# Patient Record
Sex: FEMALE | Race: OTHER | HISPANIC OR LATINO | URBAN - METROPOLITAN AREA
[De-identification: names, ages, dates, MRNs, and addresses within clinical notes are randomized per-mention and may not be internally consistent; named-entity substitution may affect disease eponyms.]

---

## 2021-03-07 ENCOUNTER — INPATIENT (INPATIENT)
Facility: HOSPITAL | Age: 51
LOS: 5 days | Discharge: HOME | DRG: 418 | End: 2021-03-13
Attending: HOSPITALIST | Admitting: HOSPITALIST
Payer: COMMERCIAL

## 2021-03-07 ENCOUNTER — TRANSCRIPTION ENCOUNTER (OUTPATIENT)
Age: 51
End: 2021-03-07

## 2021-03-07 VITALS
SYSTOLIC BLOOD PRESSURE: 134 MMHG | DIASTOLIC BLOOD PRESSURE: 88 MMHG | WEIGHT: 147.93 LBS | TEMPERATURE: 97 F | HEART RATE: 85 BPM | OXYGEN SATURATION: 99 % | RESPIRATION RATE: 16 BRPM

## 2021-03-07 DIAGNOSIS — R74.01 ELEVATION OF LEVELS OF LIVER TRANSAMINASE LEVELS: ICD-10-CM

## 2021-03-07 DIAGNOSIS — Z90.710 ACQUIRED ABSENCE OF BOTH CERVIX AND UTERUS: Chronic | ICD-10-CM

## 2021-03-07 DIAGNOSIS — K85.90 ACUTE PANCREATITIS WITHOUT NECROSIS OR INFECTION, UNSPECIFIED: ICD-10-CM

## 2021-03-07 DIAGNOSIS — Z29.9 ENCOUNTER FOR PROPHYLACTIC MEASURES, UNSPECIFIED: ICD-10-CM

## 2021-03-07 LAB
ALBUMIN SERPL ELPH-MCNC: 4.4 G/DL — SIGNIFICANT CHANGE UP (ref 3.5–5)
ALP SERPL-CCNC: 431 U/L — HIGH (ref 40–120)
ALT FLD-CCNC: 794 U/L DA — HIGH (ref 10–60)
ANION GAP SERPL CALC-SCNC: 4 MMOL/L — LOW (ref 5–17)
APPEARANCE UR: CLEAR — SIGNIFICANT CHANGE UP
AST SERPL-CCNC: 471 U/L — HIGH (ref 10–40)
BACTERIA # UR AUTO: ABNORMAL /HPF
BASOPHILS # BLD AUTO: 0.02 K/UL — SIGNIFICANT CHANGE UP (ref 0–0.2)
BASOPHILS NFR BLD AUTO: 0.2 % — SIGNIFICANT CHANGE UP (ref 0–2)
BILIRUB SERPL-MCNC: 1.9 MG/DL — HIGH (ref 0.2–1.2)
BILIRUB UR-MCNC: NEGATIVE — SIGNIFICANT CHANGE UP
BUN SERPL-MCNC: 15 MG/DL — SIGNIFICANT CHANGE UP (ref 7–18)
CALCIUM SERPL-MCNC: 9.6 MG/DL — SIGNIFICANT CHANGE UP (ref 8.4–10.5)
CHLORIDE SERPL-SCNC: 105 MMOL/L — SIGNIFICANT CHANGE UP (ref 96–108)
CO2 SERPL-SCNC: 30 MMOL/L — SIGNIFICANT CHANGE UP (ref 22–31)
COLOR SPEC: YELLOW — SIGNIFICANT CHANGE UP
COMMENT - URINE: SIGNIFICANT CHANGE UP
CREAT SERPL-MCNC: 0.7 MG/DL — SIGNIFICANT CHANGE UP (ref 0.5–1.3)
DIFF PNL FLD: NEGATIVE — SIGNIFICANT CHANGE UP
EOSINOPHIL # BLD AUTO: 0.07 K/UL — SIGNIFICANT CHANGE UP (ref 0–0.5)
EOSINOPHIL NFR BLD AUTO: 0.9 % — SIGNIFICANT CHANGE UP (ref 0–6)
EPI CELLS # UR: SIGNIFICANT CHANGE UP /HPF
GLUCOSE SERPL-MCNC: 109 MG/DL — HIGH (ref 70–99)
GLUCOSE UR QL: NEGATIVE — SIGNIFICANT CHANGE UP
HCG UR QL: NEGATIVE — SIGNIFICANT CHANGE UP
HCT VFR BLD CALC: 44.3 % — SIGNIFICANT CHANGE UP (ref 34.5–45)
HGB BLD-MCNC: 14.3 G/DL — SIGNIFICANT CHANGE UP (ref 11.5–15.5)
IMM GRANULOCYTES NFR BLD AUTO: 0.2 % — SIGNIFICANT CHANGE UP (ref 0–1.5)
KETONES UR-MCNC: NEGATIVE — SIGNIFICANT CHANGE UP
LEUKOCYTE ESTERASE UR-ACNC: ABNORMAL
LIDOCAIN IGE QN: 7299 U/L — HIGH (ref 73–393)
LYMPHOCYTES # BLD AUTO: 2.39 K/UL — SIGNIFICANT CHANGE UP (ref 1–3.3)
LYMPHOCYTES # BLD AUTO: 29.6 % — SIGNIFICANT CHANGE UP (ref 13–44)
MCHC RBC-ENTMCNC: 29.6 PG — SIGNIFICANT CHANGE UP (ref 27–34)
MCHC RBC-ENTMCNC: 32.3 GM/DL — SIGNIFICANT CHANGE UP (ref 32–36)
MCV RBC AUTO: 91.7 FL — SIGNIFICANT CHANGE UP (ref 80–100)
MONOCYTES # BLD AUTO: 0.57 K/UL — SIGNIFICANT CHANGE UP (ref 0–0.9)
MONOCYTES NFR BLD AUTO: 7.1 % — SIGNIFICANT CHANGE UP (ref 2–14)
NEUTROPHILS # BLD AUTO: 5.01 K/UL — SIGNIFICANT CHANGE UP (ref 1.8–7.4)
NEUTROPHILS NFR BLD AUTO: 62 % — SIGNIFICANT CHANGE UP (ref 43–77)
NITRITE UR-MCNC: NEGATIVE — SIGNIFICANT CHANGE UP
NRBC # BLD: 0 /100 WBCS — SIGNIFICANT CHANGE UP (ref 0–0)
PH UR: 7 — SIGNIFICANT CHANGE UP (ref 5–8)
PLATELET # BLD AUTO: 274 K/UL — SIGNIFICANT CHANGE UP (ref 150–400)
POTASSIUM SERPL-MCNC: 3.9 MMOL/L — SIGNIFICANT CHANGE UP (ref 3.5–5.3)
POTASSIUM SERPL-SCNC: 3.9 MMOL/L — SIGNIFICANT CHANGE UP (ref 3.5–5.3)
PROT SERPL-MCNC: 8.6 G/DL — HIGH (ref 6–8.3)
PROT UR-MCNC: NEGATIVE — SIGNIFICANT CHANGE UP
RBC # BLD: 4.83 M/UL — SIGNIFICANT CHANGE UP (ref 3.8–5.2)
RBC # FLD: 13.2 % — SIGNIFICANT CHANGE UP (ref 10.3–14.5)
RBC CASTS # UR COMP ASSIST: SIGNIFICANT CHANGE UP /HPF (ref 0–2)
SARS-COV-2 RNA SPEC QL NAA+PROBE: SIGNIFICANT CHANGE UP
SODIUM SERPL-SCNC: 139 MMOL/L — SIGNIFICANT CHANGE UP (ref 135–145)
SP GR SPEC: 1.01 — SIGNIFICANT CHANGE UP (ref 1.01–1.02)
UROBILINOGEN FLD QL: 4
WBC # BLD: 8.08 K/UL — SIGNIFICANT CHANGE UP (ref 3.8–10.5)
WBC # FLD AUTO: 8.08 K/UL — SIGNIFICANT CHANGE UP (ref 3.8–10.5)
WBC UR QL: ABNORMAL /HPF (ref 0–5)

## 2021-03-07 PROCEDURE — 99222 1ST HOSP IP/OBS MODERATE 55: CPT

## 2021-03-07 PROCEDURE — G1004: CPT

## 2021-03-07 PROCEDURE — 76705 ECHO EXAM OF ABDOMEN: CPT | Mod: 26

## 2021-03-07 PROCEDURE — 99285 EMERGENCY DEPT VISIT HI MDM: CPT

## 2021-03-07 PROCEDURE — 74177 CT ABD & PELVIS W/CONTRAST: CPT | Mod: 26,MG

## 2021-03-07 RX ORDER — KETOROLAC TROMETHAMINE 30 MG/ML
30 SYRINGE (ML) INJECTION EVERY 8 HOURS
Refills: 0 | Status: DISCONTINUED | OUTPATIENT
Start: 2021-03-07 | End: 2021-03-09

## 2021-03-07 RX ORDER — ONDANSETRON 8 MG/1
4 TABLET, FILM COATED ORAL ONCE
Refills: 0 | Status: COMPLETED | OUTPATIENT
Start: 2021-03-07 | End: 2021-03-07

## 2021-03-07 RX ORDER — SODIUM CHLORIDE 9 MG/ML
1000 INJECTION, SOLUTION INTRAVENOUS
Refills: 0 | Status: DISCONTINUED | OUTPATIENT
Start: 2021-03-07 | End: 2021-03-12

## 2021-03-07 RX ORDER — SODIUM CHLORIDE 9 MG/ML
1000 INJECTION INTRAMUSCULAR; INTRAVENOUS; SUBCUTANEOUS
Refills: 0 | Status: DISCONTINUED | OUTPATIENT
Start: 2021-03-07 | End: 2021-03-07

## 2021-03-07 RX ORDER — FAMOTIDINE 10 MG/ML
20 INJECTION INTRAVENOUS ONCE
Refills: 0 | Status: COMPLETED | OUTPATIENT
Start: 2021-03-07 | End: 2021-03-07

## 2021-03-07 RX ORDER — KETOROLAC TROMETHAMINE 30 MG/ML
15 SYRINGE (ML) INJECTION EVERY 4 HOURS
Refills: 0 | Status: DISCONTINUED | OUTPATIENT
Start: 2021-03-07 | End: 2021-03-07

## 2021-03-07 RX ORDER — PANTOPRAZOLE SODIUM 20 MG/1
40 TABLET, DELAYED RELEASE ORAL DAILY
Refills: 0 | Status: DISCONTINUED | OUTPATIENT
Start: 2021-03-07 | End: 2021-03-11

## 2021-03-07 RX ORDER — ENOXAPARIN SODIUM 100 MG/ML
40 INJECTION SUBCUTANEOUS DAILY
Refills: 0 | Status: DISCONTINUED | OUTPATIENT
Start: 2021-03-07 | End: 2021-03-10

## 2021-03-07 RX ORDER — IBUPROFEN 200 MG
600 TABLET ORAL EVERY 6 HOURS
Refills: 0 | Status: DISCONTINUED | OUTPATIENT
Start: 2021-03-07 | End: 2021-03-12

## 2021-03-07 RX ORDER — MORPHINE SULFATE 50 MG/1
4 CAPSULE, EXTENDED RELEASE ORAL ONCE
Refills: 0 | Status: DISCONTINUED | OUTPATIENT
Start: 2021-03-07 | End: 2021-03-07

## 2021-03-07 RX ORDER — ONDANSETRON 8 MG/1
4 TABLET, FILM COATED ORAL EVERY 6 HOURS
Refills: 0 | Status: DISCONTINUED | OUTPATIENT
Start: 2021-03-07 | End: 2021-03-11

## 2021-03-07 RX ADMIN — MORPHINE SULFATE 4 MILLIGRAM(S): 50 CAPSULE, EXTENDED RELEASE ORAL at 18:37

## 2021-03-07 RX ADMIN — FAMOTIDINE 20 MILLIGRAM(S): 10 INJECTION INTRAVENOUS at 18:36

## 2021-03-07 RX ADMIN — SODIUM CHLORIDE 200 MILLILITER(S): 9 INJECTION, SOLUTION INTRAVENOUS at 23:58

## 2021-03-07 RX ADMIN — ONDANSETRON 4 MILLIGRAM(S): 8 TABLET, FILM COATED ORAL at 18:36

## 2021-03-07 RX ADMIN — MORPHINE SULFATE 4 MILLIGRAM(S): 50 CAPSULE, EXTENDED RELEASE ORAL at 19:07

## 2021-03-07 NOTE — ED PROVIDER NOTE - CONSTITUTIONAL, MLM
normal... moderate distress, awake, alert, oriented to person, place, time/situation and in no apparent distress.

## 2021-03-07 NOTE — H&P ADULT - ASSESSMENT
52 yo female with no PMH presented with intermitted epigastric abdominal pain for 1 week. Patient is admitted for abdominal Pain possibly due to Gallstone Pancreatitis

## 2021-03-07 NOTE — H&P ADULT - NSHPPHYSICALEXAM_GEN_ALL_CORE
Vital Signs Last 24 Hrs  T(C): 36.2 (07 Mar 2021 17:25), Max: 36.2 (07 Mar 2021 17:25)  T(F): 97.1 (07 Mar 2021 17:25), Max: 97.1 (07 Mar 2021 17:25)  HR: 85 (07 Mar 2021 17:25) (85 - 85)  BP: 134/88 (07 Mar 2021 17:25) (134/88 - 134/88)  BP(mean): --  RR: 16 (07 Mar 2021 17:25) (16 - 16)  SpO2: 99% (07 Mar 2021 17:25) (99% - 99%)    GENERAL: NAD, lying in bed comfortably  HEAD:  Atraumatic, Normocephalic  EYES: EOMI, PERRLA, conjunctiva and sclera clear  ENT: Moist mucous membranes  NECK: Supple, No JVD  CHEST/LUNG: Clear to auscultation bilaterally; No rales, rhonchi, wheezing, or rubs.  HEART: Regular rate and rhythm; S1+ S2+  ABDOMEN: Bowel sounds present; Soft, tender to palpate in epigastric area, no rebound tenderness  EXTREMITIES:  2+ Peripheral Pulses, brisk capillary refill. No clubbing, cyanosis, or edema  NERVOUS SYSTEM:  Alert & Oriented , speech clear. No deficits   MSK: FROM all 4 extremities, full and equal strength  SKIN: No rashes or lesions

## 2021-03-07 NOTE — H&P ADULT - HISTORY OF PRESENT ILLNESS
52 yo female with no PMH presented with intermitted epigastric abdominal pain for 1 week. Patient states pain is radiating to the back and is associated with nausea. Patient has no previous hx of similar pain. Patient denies taking any medications, drugs, or alcohol. Patient denies CP, sob, diarrhea, constipation, headache, flu like symptoms.

## 2021-03-07 NOTE — ED PROVIDER NOTE - OBJECTIVE STATEMENT
50 y/o female with PSHx of hysterectomy, coming into the ED with history of intermittent right upper quadrant pain radiating to back x 1 week. Patient states she feels fine one day and next day feel bloated, nauseated, and feel severe pain. Patient denies history of alcohol use. Patient denies diarrhea, vomiting, or any other complaints. NKDA

## 2021-03-07 NOTE — H&P ADULT - ATTENDING COMMENTS
Patient is a 51 year old female with no reported PMH who presented with a chief complaint of abdominal pain.  ( ID: [id number])  Patient states that beginning the several days prior to current presentation, she began to experience progressively worsening, epigastric abdominal pain currently rated 10/10 in intensity, associated with nausea.    At time of examination in the ED, patient denies any headache, dizziness, chest pain, palpitations, shortness of breath, vomiting/diarrhea.    T(C): 36.2 (03-07-21 @ 17:25), Max: 36.2 (03-07-21 @ 17:25)  T(F): 97.1 (03-07-21 @ 17:25), Max: 97.1 (03-07-21 @ 17:25)  HR: 85 (03-07-21 @ 17:25) (85 - 85)  BP: 134/88 (03-07-21 @ 17:25) (134/88 - 134/88)  RR: 16 (03-07-21 @ 17:25) (16 - 16)  SpO2: 99% (03-07-21 @ 17:25) (99% - 99%)  Wt(kg): --    P/E: As above MAR    A/P:    Abdominal Pain possibly due to Gallstone Pancreatitis:  -Ultrasound Abdomen identified cholelithiasis. No pericholecystic fluid or significant wall thickening is seen. Sonographic Martines's sign was reportedly negative. Partially visualized pancreas appears unremarkable without peripancreatic fluid.  -CT Abdomen/Pelvis with IV contrast identified Suggestion of gallstones as better delineated on recent right upper quadrant ultrasound.  Hyperemic thickened gallbladder wall with trace surrounding stranding.  Pancreas within normal limits  -Lipase= 7,299  -BISAP Score= 0, Patients with a BISAP Score of 0 had <1% risk of mortality, and one study stratified patients with a score =2, given a mortality risk of 1.9%.  -Will admit to the medical floor  -Alcohol, Blood and UDS still pending in ED  -Will send Amylase, Lipid Panel, GGT, Vitamin D, ESR, CRP, Procalcitonin with AM labs.  If these initial studies are entirely unremarkable, can also consider sending IgG4, RF, REGAN, antismooth muscle antibodies  -NPO (except meds), for now  -Will start Lactated Ringer at a rate of 200mL/h, for now  -Strict I&O's with a goal urine output of 1cc/kg/hour  -Close monitoring and correction of electrolyte derangements (including glucose) as necessary  -Vital Signs Q4H  -Will hold additional antimicrobials, for now, as there is no suspicion of infected pancreatic necrosis  -Will need to ask GI to evaluate patient for further recommendations, such as possible MRCP    Nausea:  -Zofran PRN for nausea  -Will continue to closely monitor QT interval for evidence of prolongation    Elevated Alk Phos and Transaminitis:  -Will send GGT and Hepatitis Panel  -Will need to ask GI to evaluate patient for further recommendations, such as possible MRCP    Hyperbilirubinemia:  -As above  -Will also send Bilirubin Total, Direct and Indirect    Elevated Total Protein:  -Gamma Gap= 4.6 (Total Protein= 8.6, Albumin= 4.4)  -Will send HIV testing    Uncontrolled Blood Glucose:  -Hemoglobin A1c with AM labs  -Blood Glucose Monitoring ACHS  -Low-Dose Insulin Sliding Scale ACHS  -NPO (except meds), for now    GI/DVT PPx:  -Lovenox  -PPI

## 2021-03-07 NOTE — H&P ADULT - PROBLEM SELECTOR PLAN 2
Pt noted to have elevated LFT  - fu GGT and Hepatitis Panel  - bili elevated; fu total and direct bilirubin  - fu GI

## 2021-03-07 NOTE — H&P ADULT - PROBLEM SELECTOR PLAN 1
Abdominal Pain possibly due to Gallstone Pancreatitis:  -Ultrasound Abdomen identified cholelithiasis. No pericholecystic fluid or significant wall thickening is seen. Sonographic Martines's sign was reportedly negative. Partially visualized pancreas appears unremarkable without peripancreatic fluid.  -CT Abdomen/Pelvis with IV contrast identified Suggestion of gallstones as better delineated on recent right upper quadrant ultrasound.  Hyperemic thickened gallbladder wall with trace surrounding stranding.  Pancreas within normal limits  -Lipase= 7,299  -BISAP Score= 0, Patients with a BISAP Score of 0 had <1% risk of mortality, and one study stratified patients with a score =2, given a mortality risk of 1.9%.  -fu Alcohol, UDS still pending in ED  -fu Amylase, Lipid Panel, GGT, Vitamin D, ESR, Procalcitonin   -send Auto-immune work up after initial work up is completed   -NPO for now  -Will start Lactated Ringer at a rate of 200cc  -Will ask GI to evaluate patient for possible MRCP/ HIDA scan   -GI Dr. Jo consulted

## 2021-03-07 NOTE — H&P ADULT - PROBLEM SELECTOR PLAN 3
IMPROVE VTE Individual Risk Assessment  RISK                                                         Points  [  ] Previous VTE                                      3  [  ] Thrombophilia                                   2  [  ] Lower limb paralysis                         2 (unable to hold up >15 seconds)    [  ] Current Cancer                                  2       (within 6 months)  [  ] Immobilization > 24 hrs                    1  [  ] ICU/CCU stay > 24 hrs                         1  [  ] Age > 60                                              1   cw lovenox for DVT ppx

## 2021-03-08 LAB
24R-OH-CALCIDIOL SERPL-MCNC: 44.5 NG/ML — SIGNIFICANT CHANGE UP (ref 30–80)
A1C WITH ESTIMATED AVERAGE GLUCOSE RESULT: 5.9 % — HIGH (ref 4–5.6)
ALBUMIN SERPL ELPH-MCNC: 3.5 G/DL — SIGNIFICANT CHANGE UP (ref 3.5–5)
ALP SERPL-CCNC: 331 U/L — HIGH (ref 40–120)
ALT FLD-CCNC: 641 U/L DA — HIGH (ref 10–60)
AMPHET UR-MCNC: NEGATIVE — SIGNIFICANT CHANGE UP
AMYLASE P1 CFR SERPL: 84 U/L — SIGNIFICANT CHANGE UP (ref 25–115)
APAP SERPL-MCNC: <10 UG/ML — SIGNIFICANT CHANGE UP (ref 10–30)
AST SERPL-CCNC: 288 U/L — HIGH (ref 10–40)
BARBITURATES UR SCN-MCNC: NEGATIVE — SIGNIFICANT CHANGE UP
BENZODIAZ UR-MCNC: NEGATIVE — SIGNIFICANT CHANGE UP
BILIRUB DIRECT SERPL-MCNC: 0.3 MG/DL — HIGH (ref 0–0.2)
BILIRUB DIRECT SERPL-MCNC: 0.3 MG/DL — HIGH (ref 0–0.2)
BILIRUB INDIRECT FLD-MCNC: 0.5 MG/DL — SIGNIFICANT CHANGE UP (ref 0.2–1)
BILIRUB INDIRECT FLD-MCNC: 0.5 MG/DL — SIGNIFICANT CHANGE UP (ref 0.2–1)
BILIRUB SERPL-MCNC: 0.8 MG/DL — SIGNIFICANT CHANGE UP (ref 0.2–1.2)
BILIRUB SERPL-MCNC: 0.8 MG/DL — SIGNIFICANT CHANGE UP (ref 0.2–1.2)
CHOLEST SERPL-MCNC: 218 MG/DL — HIGH
COCAINE METAB.OTHER UR-MCNC: NEGATIVE — SIGNIFICANT CHANGE UP
ERYTHROCYTE [SEDIMENTATION RATE] IN BLOOD: 7 MM/HR — SIGNIFICANT CHANGE UP (ref 0–20)
ESTIMATED AVERAGE GLUCOSE: 123 MG/DL — HIGH (ref 68–114)
ETHANOL SERPL-MCNC: <3 MG/DL — SIGNIFICANT CHANGE UP (ref 0–10)
FERRITIN SERPL-MCNC: 107 NG/ML — SIGNIFICANT CHANGE UP (ref 15–150)
FOLATE SERPL-MCNC: >20 NG/ML — SIGNIFICANT CHANGE UP
GGT SERPL-CCNC: 1093 U/L — HIGH (ref 8–40)
HAV IGM SER-ACNC: SIGNIFICANT CHANGE UP
HBV CORE IGM SER-ACNC: SIGNIFICANT CHANGE UP
HBV SURFACE AG SER-ACNC: SIGNIFICANT CHANGE UP
HCT VFR BLD CALC: 39.7 % — SIGNIFICANT CHANGE UP (ref 34.5–45)
HCV AB S/CO SERPL IA: 0.11 S/CO — SIGNIFICANT CHANGE UP (ref 0–0.99)
HCV AB SERPL-IMP: SIGNIFICANT CHANGE UP
HDLC SERPL-MCNC: 93 MG/DL — SIGNIFICANT CHANGE UP
HGB BLD-MCNC: 12.6 G/DL — SIGNIFICANT CHANGE UP (ref 11.5–15.5)
LDH SERPL L TO P-CCNC: 261 U/L — HIGH (ref 120–225)
LIDOCAIN IGE QN: 540 U/L — HIGH (ref 73–393)
LIPID PNL WITH DIRECT LDL SERPL: 114 MG/DL — HIGH
MAGNESIUM SERPL-MCNC: 2.2 MG/DL — SIGNIFICANT CHANGE UP (ref 1.6–2.6)
MCHC RBC-ENTMCNC: 28.8 PG — SIGNIFICANT CHANGE UP (ref 27–34)
MCHC RBC-ENTMCNC: 31.7 GM/DL — LOW (ref 32–36)
MCV RBC AUTO: 90.8 FL — SIGNIFICANT CHANGE UP (ref 80–100)
METHADONE UR-MCNC: NEGATIVE — SIGNIFICANT CHANGE UP
NON HDL CHOLESTEROL: 125 MG/DL — SIGNIFICANT CHANGE UP
NRBC # BLD: 0 /100 WBCS — SIGNIFICANT CHANGE UP (ref 0–0)
OPIATES UR-MCNC: POSITIVE
PCP SPEC-MCNC: SIGNIFICANT CHANGE UP
PCP UR-MCNC: NEGATIVE — SIGNIFICANT CHANGE UP
PHOSPHATE SERPL-MCNC: 4.3 MG/DL — SIGNIFICANT CHANGE UP (ref 2.5–4.5)
PLATELET # BLD AUTO: 248 K/UL — SIGNIFICANT CHANGE UP (ref 150–400)
PROCALCITONIN SERPL-MCNC: 0.11 NG/ML — HIGH (ref 0.02–0.1)
PROT SERPL-MCNC: 6.8 G/DL — SIGNIFICANT CHANGE UP (ref 6–8.3)
RBC # BLD: 4.37 M/UL — SIGNIFICANT CHANGE UP (ref 3.8–5.2)
RBC # FLD: 13.1 % — SIGNIFICANT CHANGE UP (ref 10.3–14.5)
SARS-COV-2 IGG SERPL QL IA: NEGATIVE — SIGNIFICANT CHANGE UP
SARS-COV-2 IGM SERPL IA-ACNC: 0.07 INDEX — SIGNIFICANT CHANGE UP
THC UR QL: NEGATIVE — SIGNIFICANT CHANGE UP
TRIGL SERPL-MCNC: 53 MG/DL — SIGNIFICANT CHANGE UP
TSH SERPL-MCNC: 2 UU/ML — SIGNIFICANT CHANGE UP (ref 0.34–4.82)
VIT B12 SERPL-MCNC: 1781 PG/ML — HIGH (ref 232–1245)
WBC # BLD: 5.22 K/UL — SIGNIFICANT CHANGE UP (ref 3.8–10.5)
WBC # FLD AUTO: 5.22 K/UL — SIGNIFICANT CHANGE UP (ref 3.8–10.5)

## 2021-03-08 PROCEDURE — 99232 SBSQ HOSP IP/OBS MODERATE 35: CPT | Mod: GC

## 2021-03-08 PROCEDURE — 74181 MRI ABDOMEN W/O CONTRAST: CPT | Mod: 26

## 2021-03-08 PROCEDURE — 99222 1ST HOSP IP/OBS MODERATE 55: CPT

## 2021-03-08 RX ORDER — INFLUENZA VIRUS VACCINE 15; 15; 15; 15 UG/.5ML; UG/.5ML; UG/.5ML; UG/.5ML
0.5 SUSPENSION INTRAMUSCULAR ONCE
Refills: 0 | Status: DISCONTINUED | OUTPATIENT
Start: 2021-03-08 | End: 2021-03-13

## 2021-03-08 RX ADMIN — Medication 30 MILLIGRAM(S): at 22:08

## 2021-03-08 RX ADMIN — Medication 30 MILLIGRAM(S): at 22:50

## 2021-03-08 RX ADMIN — ENOXAPARIN SODIUM 40 MILLIGRAM(S): 100 INJECTION SUBCUTANEOUS at 11:11

## 2021-03-08 RX ADMIN — PANTOPRAZOLE SODIUM 40 MILLIGRAM(S): 20 TABLET, DELAYED RELEASE ORAL at 11:11

## 2021-03-08 NOTE — PROGRESS NOTE ADULT - PROBLEM SELECTOR PLAN 1
- p/w epigastic pain   - callie 2/2 Gallstone Pancreatitis  - BAL <3  - lipase 7,299 >540  - U/S abd cholelithiasis  - CT A/P gallstones, but pancreas within normal limits  - BISAP Score= 0  - c/w LR 200cc  - f/u MRCP  - GI, Dr. Jo, onboard

## 2021-03-08 NOTE — PATIENT PROFILE ADULT - OVER THE PAST TWO WEEKS HAVE YOU FELT DOWN, DEPRESSED OR HOPELESS?
Problem: Non-Pressure Injury Wound  Goal: # No deterioration in wound  Outcome: Outcome Met, Continue evaluating goal progress toward completion  Wound continuing to show improvement.  Some maceration noted at edges and blistering at edges of tape.  Duoderm placed at wound margins to protect form foam.  Duoderm placed over blistered areas.  Explained to patient what it was and that it may come off on it's own and that was okay if that happened       
no

## 2021-03-08 NOTE — PROGRESS NOTE ADULT - SUBJECTIVE AND OBJECTIVE BOX
PGY-1 Progress Note discussed with attending    PAGER #: [[phone]] TILL 5:00 PM  PLEASE CONTACT ON CALL TEAM:  - On Call Team (Please refer to Jatin) FROM 5:00 PM - 8:30PM  - Nightfloat Team FROM 8:30 -7:30 AM    INTERVAL HPI  - CT abd showed gallstone without any peripancreatic changes. Lipase noted to be 7299    OVERNIGHT EVENTS:   - Patient's abdominal pain improved with pain management, NPO and IVF. She reports returning appetite. She denies fever, chills, n/v, chest pain, sob, abdominal pain, urinary or bowel movement changes.    REVIEW OF SYSTEMS:  CONSTITUTIONAL: No fever, weight loss, or fatigue  RESPIRATORY: No cough, wheezing, chills or hemoptysis; No shortness of breath  CARDIOVASCULAR: No chest pain, palpitations, dizziness, or leg swelling  GASTROINTESTINAL: No abdominal pain. No nausea, vomiting, or hematemesis; No diarrhea or constipation. No melena or hematochezia.  GENITOURINARY: No dysuria or hematuria, urinary frequency  NEUROLOGICAL: No headaches, memory loss, loss of strength, numbness, or tremors  SKIN: No itching, burning, rashes, or lesions     MEDICATIONS  (STANDING):  enoxaparin Injectable 40 milliGRAM(s) SubCutaneous daily  influenza   Vaccine 0.5 milliLiter(s) IntraMuscular once  ketorolac   Injectable 30 milliGRAM(s) IV Push every 8 hours  lactated ringers. 1000 milliLiter(s) (200 mL/Hr) IV Continuous <Continuous>  pantoprazole  Injectable 40 milliGRAM(s) IV Push daily    MEDICATIONS  (PRN):  ibuprofen  Tablet. 600 milliGRAM(s) Oral every 6 hours PRN Mild Pain (1 - 3)  ketorolac   Injectable 15 milliGRAM(s) IV Push every 4 hours PRN Moderate Pain (4 - 6)  ondansetron Injectable 4 milliGRAM(s) IV Push every 6 hours PRN Nausea and/or Vomiting      Vital Signs Last 24 Hrs  T(C): 36.4 (08 Mar 2021 14:44), Max: 36.5 (07 Mar 2021 23:30)  T(F): 97.6 (08 Mar 2021 14:44), Max: 97.7 (07 Mar 2021 23:30)  HR: 74 (08 Mar 2021 14:44) (62 - 85)  BP: 115/80 (08 Mar 2021 14:44) (102/64 - 134/88)  BP(mean): --  RR: 18 (08 Mar 2021 14:44) (16 - 18)  SpO2: 99% (08 Mar 2021 14:44) (96% - 99%)    PHYSICAL EXAMINATION:  GENERAL: NAD, AAOx3  HEAD: AT/NC  EYES: conjunctiva and sclera clear  NECK: supple, No JVD noted, Normal thyroid  CHEST/LUNG: CTABL; no rales, rhonchi, wheezing, or rubs  HEART: regular rate and rhythm; no murmurs, rubs, or gallops  ABDOMEN: soft, nontender, nondistended; Bowel sounds present  EXTREMITIES:  2+ Peripheral Pulses, No clubbing, cyanosis, or edema  SKIN: warm dry                          12.6   5.22  )-----------( 248      ( 08 Mar 2021 06:45 )             39.7     03-07    139  |  105  |  15  ----------------------------<  109<H>  3.9   |  30  |  0.70    Ca    9.6      07 Mar 2021 18:36  Phos  4.3     03-08  Mg     2.2     03-08    TPro  6.8  /  Alb  3.5  /  TBili  0.8  /  DBili  0.3<H>  /  AST  288<H>  /  ALT  641<H>  /  AlkPhos  331<H>  03-08    LIVER FUNCTIONS - ( 08 Mar 2021 06:45 )  Alb: 3.5 g/dL / Pro: 6.8 g/dL / ALK PHOS: 331 U/L / ALT: 641 U/L DA / AST: 288 U/L / GGT: x                 COVID-19 PCR: NotDetec (07 Mar 2021 21:18)      CAPILLARY BLOOD GLUCOSE          RADIOLOGY & ADDITIONAL TESTS:

## 2021-03-08 NOTE — PROGRESS NOTE ADULT - ASSESSMENT
50 yo female with no PMH presented with intermitted epigastric abdominal pain for 1 week. Patient is admitted for abdominal Pain possibly due to Gallstone Pancreatitis

## 2021-03-08 NOTE — PROGRESS NOTE ADULT - PROBLEM SELECTOR PLAN 2
- likely 2/2 gallstone  - tbili 1.9 >0.8>0.8  - d.bili 0.3>0.3  -  >288  - monitor LFTs daily  - >641  - >331  - f/u MRCP, GGT and Hepatitis Panel

## 2021-03-08 NOTE — CONSULT NOTE ADULT - ASSESSMENT
51 y.o. F with resolving gallstone pancreatitis    -May advance to low fat diet  -Plan for lap milton prior to discharge on present admission  -Pain control prn  -OOB ambulate

## 2021-03-08 NOTE — CONSULT NOTE ADULT - SUBJECTIVE AND OBJECTIVE BOX
Patient is a 51y old  Female who presents with a chief complaint of abdominal pain, (08 Mar 2021 15:51)      HPI  Called see and eval 51y.o. Female with no significant PMH for gallstone pancreatitis. Pt admitted yesterday c/o epigastric pain for 1 week, radiating to back. Associated with nausea and vomiting. Denies fever, chills, diarrhea, constipation. No previous episodes of similar symptoms. CT abd/pelvis showed acute pancreatitis. Lipase 7299K, improved to 540. Currently tolerating clear liquid diet.     PAST MEDICAL & SURGICAL HISTORY:  No pertinent past medical history    H/O: hysterectomy    MEDICATIONS  (STANDING):  enoxaparin Injectable 40 milliGRAM(s) SubCutaneous daily  influenza   Vaccine 0.5 milliLiter(s) IntraMuscular once  ketorolac   Injectable 30 milliGRAM(s) IV Push every 8 hours  lactated ringers. 1000 milliLiter(s) (200 mL/Hr) IV Continuous <Continuous>  pantoprazole  Injectable 40 milliGRAM(s) IV Push daily    MEDICATIONS  (PRN):  ibuprofen  Tablet. 600 milliGRAM(s) Oral every 6 hours PRN Mild Pain (1 - 3)  ketorolac   Injectable 15 milliGRAM(s) IV Push every 4 hours PRN Moderate Pain (4 - 6)  ondansetron Injectable 4 milliGRAM(s) IV Push every 6 hours PRN Nausea and/or Vomiting    Allergies    No Known Allergies    Vital Signs Last 24 Hrs  T(C): 36.4 (08 Mar 2021 14:44), Max: 36.5 (07 Mar 2021 23:30)  T(F): 97.6 (08 Mar 2021 14:44), Max: 97.7 (07 Mar 2021 23:30)  HR: 74 (08 Mar 2021 14:44) (62 - 74)  BP: 115/80 (08 Mar 2021 14:44) (102/64 - 130/86)  BP(mean): --  RR: 18 (08 Mar 2021 14:44) (18 - 18)  SpO2: 99% (08 Mar 2021 14:44) (96% - 99%)    Physical:  Gen: A&Ox3. NAD  Abd: Soft ND, NT    LABS:                        12.6   5.22  )-----------( 248      ( 08 Mar 2021 06:45 )             39.7              03-07    139  |  105  |  15  ----------------------------<  109<H>  3.9   |  30  |  0.70    Ca    9.6      07 Mar 2021 18:36  Phos  4.3       Mg     2.2         TPro  6.8  /  Alb  3.5  /  TBili  0.8  /  DBili  0.3<H>  /  AST  288<H>  /  ALT  641<H>  /  AlkPhos  331<H>                Urinalysis Basic - ( 07 Mar 2021 18:36 )    Color: Yellow / Appearance: Clear / S.010 / pH: x  Gluc: x / Ketone: Negative  / Bili: Negative / Urobili: 4   Blood: x / Protein: Negative / Nitrite: Negative   Leuk Esterase: Trace / RBC: 0-2 /HPF / WBC 6-10 /HPF   Sq Epi: x / Non Sq Epi: Few /HPF / Bacteria: Few /HPF    RADIOLOGY & ADDITIONAL STUDIES:  < from: CT Abdomen and Pelvis w/ IV Cont (21 @ 20:55) >  IMPRESSION:    Suggestion of gallstones as better delineated on recent right upper quadrant ultrasound.  Hyperemic thickened gallbladder wall with trace surrounding stranding. Consider correlation with HIDA scan to exclude acute cholecystitis.    Small hiatal hernia or wall thickening of the distal thoracic esophagus. Consider nonemergent upper endoscopy evaluation if indicated.    < end of copied text >    < from: US Abdomen Limited (21 @ 19:33) >  FINDINGS:    Hepatic steatosis.    There is cholelithiasis. No pericholecystic fluid or significant wall thickening is seen. Sonographic Martines's sign was reportedly negative.    The common duct measures 4 mm in diameter.    Partially visualized pancreas appears unremarkable without peripancreatic fluid.    < end of copied text >    
   [facility] GI CONSULTATION    Patient is a 51y old  Female who presents with a chief complaint of abdominal pain, (07 Mar 2021 22:29)    HPI:  52 yo female with no PMH presented with intermitted epigastric abdominal pain for 1 week. Patient states pain is radiating to the back and is associated with nausea. Patient has no previous hx of similar pain. Patient denies taking any medications, drugs, or alcohol. Patient denies CP, sob, diarrhea, constipation, headache, flu like symptoms.  (07 Mar 2021 22:29)    PMH/PSH:  PAST MEDICAL & SURGICAL HISTORY:  No pertinent past medical history    H/O: hysterectomy      FH:  FAMILY HISTORY:  Mother  52 - MI  Dad  82 - Prostrate Ca      MEDS:  MEDICATIONS  (STANDING):  enoxaparin Injectable 40 milliGRAM(s) SubCutaneous daily  influenza   Vaccine 0.5 milliLiter(s) IntraMuscular once  ketorolac   Injectable 30 milliGRAM(s) IV Push every 8 hours  lactated ringers. 1000 milliLiter(s) (200 mL/Hr) IV Continuous <Continuous>  pantoprazole  Injectable 40 milliGRAM(s) IV Push daily    MEDICATIONS  (PRN):  ibuprofen  Tablet. 600 milliGRAM(s) Oral every 6 hours PRN Mild Pain (1 - 3)  ketorolac   Injectable 15 milliGRAM(s) IV Push every 4 hours PRN Moderate Pain (4 - 6)  ondansetron Injectable 4 milliGRAM(s) IV Push every 6 hours PRN Nausea and/or Vomiting    Allergies    No Known Allergies    Intolerances            CONSTITUTIONAL:  No weight loss, fever, chills, weakness or fatigue.  HEENT:  Eyes:  No visual loss, blurred vision, double vision or yellow sclerae. Ears, Nose, Throat:  No hearing loss, sneezing, congestion, runny nose or sore throat.  SKIN:  No rash or itching.  CARDIOVASCULAR:  No chest pain, chest pressure or chest discomfort. No palpitations or edema.  RESPIRATORY:  No shortness of breath, cough or sputum.  GASTROINTESTINAL:  SEE HPI  GENITOURINARY:  No dysuria, hematuria, urinary frequency  NEUROLOGICAL:  No headache, dizziness, syncope, paralysis, ataxia, numbness or tingling in the extremities. No change in bowel or bladder control.  MUSCULOSKELETAL:  No muscle, back pain, joint pain or stiffness.  HEMATOLOGIC:  No anemia, bleeding or bruising.  LYMPHATICS:  No enlarged nodes. No history of splenectomy.  PSYCHIATRIC:  No history of depression or anxiety.  ENDOCRINOLOGIC:  No reports of sweating, cold or heat intolerance. No polyuria or polydipsia.      ______________________________________________________________________  PHYSICAL EXAM:  T(C): 36.4 (21 @ 04:52), Max: 36.5 (21 @ 23:30)  HR: 69 (21 @ 04:52)  BP: 102/64 (21 @ 04:52)  RR: 18 (21 @ 04:52)  SpO2: 96% (21 @ 04:52)  Wt(kg): --      GEN: NAD, normocephalic  CVS: S1S2+  CHEST: clear to auscultation  ABD: soft , nontender, nondistended, bowel sounds present, no rebound, no guarding  EXTR: no cyanosis, no clubbing, no edema  NEURO: Awake and alert; oriented to person, place, time and situation   SKIN:  warm;  non icteric    ______________________________________________________________________  LABS:                        12.6   5.22  )-----------( 248      ( 08 Mar 2021 06:45 )             39.7     03-07    139  |  105  |  15  ----------------------------<  109<H>  3.9   |  30  |  0.70    Ca    9.6      07 Mar 2021 18:36  Phos  4.3     03-08  Mg     2.2     -08    TPro  x   /  Alb  x   /  TBili  0.8  /  DBili  0.3<H>  /  AST  x   /  ALT  x   /  AlkPhos  x   03-08    LIVER FUNCTIONS - ( 07 Mar 2021 18:36 )  Alb: 4.4 g/dL / Pro: 8.6 g/dL / ALK PHOS: 431 U/L / ALT: 794 U/L DA / AST: 471 U/L / GGT: x

## 2021-03-08 NOTE — CONSULT NOTE ADULT - ASSESSMENT
GI asked to evaluate this 51 year old female who reports hx of HLD. Patient states she was taking 10mg of a cholesterol pill (she forgot name) x 3 months. She states she stopped taking med in December as per her PCP. She presents with intermittent epigastric pain with radiation to her back x 1 week. + nausea and reports vomiting x 1. NBNB.     # [phone]

## 2021-03-08 NOTE — CONSULT NOTE ADULT - PROBLEM SELECTOR RECOMMENDATION 9
Lipase - 7299  CT A/P with IV contrast showing Hyperemic thickened gallbladder wall with trace surrounding stranding  Abd US showing Cholelithiasis. No pericholecystic fluid or significant wall thickening  c/w IV hydration with LR  pain management Lipase - 7299  CT A/P with IV contrast showing Hyperemic thickened gallbladder wall with trace surrounding stranding  Abd US showing Cholelithiasis. No pericholecystic fluid or significant wall thickening  c/w IV hydration with LR  pain management  sx eval

## 2021-03-08 NOTE — CONSULT NOTE ADULT - PROBLEM SELECTOR RECOMMENDATION 2
dexter CASTILLO this am tbili WNL this am   ALT >200 which could represent choledocholithiasis   recommend MRCP  f/u hep panel  monitor LFT's

## 2021-03-09 DIAGNOSIS — K80.20 CALCULUS OF GALLBLADDER WITHOUT CHOLECYSTITIS WITHOUT OBSTRUCTION: ICD-10-CM

## 2021-03-09 LAB
ALBUMIN SERPL ELPH-MCNC: 3.3 G/DL — LOW (ref 3.5–5)
ALP SERPL-CCNC: 271 U/L — HIGH (ref 40–120)
ALT FLD-CCNC: 448 U/L DA — HIGH (ref 10–60)
ANION GAP SERPL CALC-SCNC: 7 MMOL/L — SIGNIFICANT CHANGE UP (ref 5–17)
APTT BLD: 33 SEC — SIGNIFICANT CHANGE UP (ref 27.5–35.5)
AST SERPL-CCNC: 98 U/L — HIGH (ref 10–40)
BILIRUB SERPL-MCNC: 0.5 MG/DL — SIGNIFICANT CHANGE UP (ref 0.2–1.2)
BUN SERPL-MCNC: 11 MG/DL — SIGNIFICANT CHANGE UP (ref 7–18)
CALCIUM SERPL-MCNC: 8.7 MG/DL — SIGNIFICANT CHANGE UP (ref 8.4–10.5)
CHLORIDE SERPL-SCNC: 106 MMOL/L — SIGNIFICANT CHANGE UP (ref 96–108)
CO2 SERPL-SCNC: 30 MMOL/L — SIGNIFICANT CHANGE UP (ref 22–31)
CREAT SERPL-MCNC: 0.7 MG/DL — SIGNIFICANT CHANGE UP (ref 0.5–1.3)
GLUCOSE SERPL-MCNC: 99 MG/DL — SIGNIFICANT CHANGE UP (ref 70–99)
HCT VFR BLD CALC: 40.1 % — SIGNIFICANT CHANGE UP (ref 34.5–45)
HGB BLD-MCNC: 12.8 G/DL — SIGNIFICANT CHANGE UP (ref 11.5–15.5)
INR BLD: 0.99 RATIO — SIGNIFICANT CHANGE UP (ref 0.88–1.16)
MAGNESIUM SERPL-MCNC: 2.1 MG/DL — SIGNIFICANT CHANGE UP (ref 1.6–2.6)
MCHC RBC-ENTMCNC: 29.2 PG — SIGNIFICANT CHANGE UP (ref 27–34)
MCHC RBC-ENTMCNC: 31.9 GM/DL — LOW (ref 32–36)
MCV RBC AUTO: 91.6 FL — SIGNIFICANT CHANGE UP (ref 80–100)
NRBC # BLD: 0 /100 WBCS — SIGNIFICANT CHANGE UP (ref 0–0)
PHOSPHATE SERPL-MCNC: 4 MG/DL — SIGNIFICANT CHANGE UP (ref 2.5–4.5)
PLATELET # BLD AUTO: 235 K/UL — SIGNIFICANT CHANGE UP (ref 150–400)
POTASSIUM SERPL-MCNC: 3.6 MMOL/L — SIGNIFICANT CHANGE UP (ref 3.5–5.3)
POTASSIUM SERPL-SCNC: 3.6 MMOL/L — SIGNIFICANT CHANGE UP (ref 3.5–5.3)
PROT SERPL-MCNC: 6.6 G/DL — SIGNIFICANT CHANGE UP (ref 6–8.3)
PROTHROM AB SERPL-ACNC: 11.8 SEC — SIGNIFICANT CHANGE UP (ref 10.6–13.6)
RBC # BLD: 4.38 M/UL — SIGNIFICANT CHANGE UP (ref 3.8–5.2)
RBC # FLD: 12.9 % — SIGNIFICANT CHANGE UP (ref 10.3–14.5)
SODIUM SERPL-SCNC: 143 MMOL/L — SIGNIFICANT CHANGE UP (ref 135–145)
WBC # BLD: 4.71 K/UL — SIGNIFICANT CHANGE UP (ref 3.8–10.5)
WBC # FLD AUTO: 4.71 K/UL — SIGNIFICANT CHANGE UP (ref 3.8–10.5)

## 2021-03-09 PROCEDURE — 99232 SBSQ HOSP IP/OBS MODERATE 35: CPT | Mod: GC

## 2021-03-09 RX ADMIN — PANTOPRAZOLE SODIUM 40 MILLIGRAM(S): 20 TABLET, DELAYED RELEASE ORAL at 11:38

## 2021-03-09 RX ADMIN — ENOXAPARIN SODIUM 40 MILLIGRAM(S): 100 INJECTION SUBCUTANEOUS at 11:38

## 2021-03-09 NOTE — PROGRESS NOTE ADULT - PROBLEM SELECTOR PLAN 2
- likely 2/2 gallstone  - tbili 1.9 >0.8>0.8  - d.bili 0.3>0.3  -  >288  - monitor LFTs daily  - >641  - >331  - f/u MRCP, GGT and Hepatitis Panel as above

## 2021-03-09 NOTE — PROGRESS NOTE ADULT - PROBLEM SELECTOR PLAN 1
tbili remains within normal  AST/ALT trending down   No evidence for choledocholithiasis on MRCP  MRCP showing Small gallstones in the gallbladder. Mild gallbladder wall thickening/edema and/or trace pericholecystic fluid. Findings c/w acute cholecystitis  sx note appreciated  plan for lap milton this admission

## 2021-03-09 NOTE — PROGRESS NOTE ADULT - ASSESSMENT
51 y.o. F with resolving gallstone pancreatitis    -Advance to low fat diet  -Plan for lap milton on present admission, 3/11/21  -Pain control prn  -OOB/ Ambulate  -DVT ppx

## 2021-03-09 NOTE — PROGRESS NOTE ADULT - PROBLEM SELECTOR PLAN 1
- p/w epigastic pain   - callie 2/2 Gallstone Pancreatitis  - BAL <3  - lipase 7,299 >540  - U/S abd cholelithiasis  - CT A/P gallstones, but pancreas within normal limits  - BISAP Score= 0  - MRCP small gallstones in the gallbladder. Mild gallbladder wall thickening/edema and/or trace pericholecystic fluid. Findings c/w acute cholecystitis  - c/w LR 200cc  - f/u MRCP  - GI, Dr. Jo, onboard - p/w epigastic pain   - callie 2/2 Gallstone Pancreatitis  - BAL <3  - lipase 7,299 >540  - U/S abd cholelithiasis  - CT A/P gallstones, but pancreas within normal limits  - BISAP Score= 0  - MRCP small gallstones w/ wall thickening/edema and/or trace pericholecystic fluid  - c/w pain management and oral intake  - plan for elective cholecystectomy on 3/11    - GI, Dr. Jo, onboard  - surgery onboard

## 2021-03-09 NOTE — PROGRESS NOTE ADULT - SUBJECTIVE AND OBJECTIVE BOX
GI PROGRESS NOTE    Patient is a 51y old  Female who presents with a chief complaint of abdominal pain, (09 Mar 2021 10:27)      HPI:  52 yo female with no PMH presented with intermitted epigastric abdominal pain for 1 week. Patient states pain is radiating to the back and is associated with nausea. Patient has no previous hx of similar pain. Patient denies taking any medications, drugs, or alcohol. Patient denies CP, sob, diarrhea, constipation, headache, flu like symptoms.  (07 Mar 2021 22:29)          ______________________________________________________________________  PMH/PSH:  PAST MEDICAL & SURGICAL HISTORY:  No pertinent past medical history    H/O: hysterectomy      ______________________________________________________________________  MEDS:  MEDICATIONS  (STANDING):  enoxaparin Injectable 40 milliGRAM(s) SubCutaneous daily  influenza   Vaccine 0.5 milliLiter(s) IntraMuscular once  ketorolac   Injectable 30 milliGRAM(s) IV Push every 8 hours  lactated ringers. 1000 milliLiter(s) (200 mL/Hr) IV Continuous <Continuous>  pantoprazole  Injectable 40 milliGRAM(s) IV Push daily    MEDICATIONS  (PRN):  ibuprofen  Tablet. 600 milliGRAM(s) Oral every 6 hours PRN Mild Pain (1 - 3)  ketorolac   Injectable 15 milliGRAM(s) IV Push every 4 hours PRN Moderate Pain (4 - 6)  ondansetron Injectable 4 milliGRAM(s) IV Push every 6 hours PRN Nausea and/or Vomiting    ______________________________________________________________________  ALL:   Allergies    No Known Allergies    Intolerances      ______________________________________________________________________  SH: Social History:  Alcohol: Denied  Smoking: Denied  Illicit drugs: Denied (07 Mar 2021 22:29)    ______________________________________________________________________  FH:  FAMILY HISTORY:    ______________________________________________________________________  ROS:    CONSTITUTIONAL:  No weight loss, fever, chills, weakness or fatigue.    HEENT:  Eyes:  No visual loss, blurred vision, double vision or yellow sclerae. Ears, Nose, Throat:  No hearing loss, sneezing, congestion, runny nose or sore throat.    SKIN:  No rash or itching.    CARDIOVASCULAR:  No chest pain, chest pressure or chest discomfort. No palpitations or edema.    RESPIRATORY:  No shortness of breath, cough or sputum.    GASTROINTESTINAL:  SEE HPI    GENITOURINARY:  No dysuria, hematuria, urinary frequency    NEUROLOGICAL:  No headache, dizziness, syncope, paralysis, ataxia, numbness or tingling in the extremities. No change in bowel or bladder control.    MUSCULOSKELETAL:  No muscle, back pain, joint pain or stiffness.    HEMATOLOGIC:  No anemia, bleeding or bruising.    LYMPHATICS:  No enlarged nodes. No history of splenectomy.    PSYCHIATRIC:  No history of depression or anxiety.    ENDOCRINOLOGIC:  No reports of sweating, cold or heat intolerance. No polyuria or polydipsia.    ALLERGIES:  No history of asthma, hives, eczema or rhinitis.  ______________________________________________________________________  PHYSICAL EXAM:  T(C): 36.3 (03-09-21 @ 13:22), Max: 36.6 (03-08-21 @ 21:36)  HR: 78 (03-09-21 @ 13:22)  BP: 118/75 (03-09-21 @ 13:22)  RR: 18 (03-09-21 @ 13:22)  SpO2: 95% (03-09-21 @ 13:22)  Wt(kg): --    03-08 - 03-09  --------------------------------------------------------  IN:    Lactated Ringers: 600 mL  Total IN: 600 mL    OUT:  Total OUT: 0 mL    Total NET: 600 mL          GEN: NAD   CVS- S1 S2  ABD: soft nontender, non distended, bowel sounds+  LUNGS: clear to auscultation  NEURO: noin focal neuro exam; AAO x 3  Extremities: no cyanosis, no calf tenderness, no edema, no clubbing      ______________________________________________________________________  LABS:                        12.8   4.71  )-----------( 235      ( 09 Mar 2021 06:49 )             40.1     03-09    143  |  106  |  11  ----------------------------<  99  3.6   |  30  |  0.70    Ca    8.7      09 Mar 2021 06:49  Phos  4.0     03-09  Mg     2.1     03-09    TPro  6.6  /  Alb  3.3<L>  /  TBili  0.5  /  DBili  x   /  AST  98<H>  /  ALT  448<H>  /  AlkPhos  271<H>  03-09    LIVER FUNCTIONS - ( 09 Mar 2021 06:49 )  Alb: 3.3 g/dL / Pro: 6.6 g/dL / ALK PHOS: 271 U/L / ALT: 448 U/L DA / AST: 98 U/L / GGT: x

## 2021-03-09 NOTE — PROGRESS NOTE ADULT - SUBJECTIVE AND OBJECTIVE BOX
PGY-1 Progress Note discussed with attending    PAGER #: [phone]] TILL 5:00 PM  PLEASE CONTACT ON CALL TEAM:  - On Call Team (Please refer to Jatin) FROM 5:00 PM - 8:30PM  - Nightfloat Team FROM 8:30 -7:30 AM    INTERVAL HPI  -  CT abd showed gallstone without any peripancreatic changes. Lipase noted to be 7299. Lipase and LFTs trending down. Patient remains asymptomatic and tolerating clear liquid diet. Patient is planned for elective cholecystectomy on 3/11    OVERNIGHT EVENTS:   - No acute events overnight. Patient is completely asymptomatic; tolerating oral intake. Plan for elective cholecystectomy as inpatient.     REVIEW OF SYSTEMS:  CONSTITUTIONAL: No fever, weight loss, or fatigue  RESPIRATORY: No cough, wheezing, chills or hemoptysis; No shortness of breath  CARDIOVASCULAR: No chest pain, palpitations, dizziness, or leg swelling  GASTROINTESTINAL: No abdominal pain. No nausea, vomiting, or hematemesis; No diarrhea or constipation. No melena or hematochezia.  GENITOURINARY: No dysuria or hematuria, urinary frequency  NEUROLOGICAL: No headaches, memory loss, loss of strength, numbness, or tremors  SKIN: No itching, burning, rashes, or lesions     MEDICATIONS  (STANDING):  enoxaparin Injectable 40 milliGRAM(s) SubCutaneous daily  influenza   Vaccine 0.5 milliLiter(s) IntraMuscular once  ketorolac   Injectable 30 milliGRAM(s) IV Push every 8 hours  lactated ringers. 1000 milliLiter(s) (200 mL/Hr) IV Continuous <Continuous>  pantoprazole  Injectable 40 milliGRAM(s) IV Push daily    MEDICATIONS  (PRN):  ibuprofen  Tablet. 600 milliGRAM(s) Oral every 6 hours PRN Mild Pain (1 - 3)  ketorolac   Injectable 15 milliGRAM(s) IV Push every 4 hours PRN Moderate Pain (4 - 6)  ondansetron Injectable 4 milliGRAM(s) IV Push every 6 hours PRN Nausea and/or Vomiting      Vital Signs Last 24 Hrs  T(C): 36.3 (09 Mar 2021 13:22), Max: 36.6 (08 Mar 2021 21:36)  T(F): 97.4 (09 Mar 2021 13:22), Max: 97.8 (08 Mar 2021 21:36)  HR: 78 (09 Mar 2021 13:22) (58 - 78)  BP: 118/75 (09 Mar 2021 13:22) (100/64 - 118/75)  BP(mean): --  RR: 18 (09 Mar 2021 13:22) (18 - 18)  SpO2: 95% (09 Mar 2021 13:22) (95% - 98%)    PHYSICAL EXAMINATION:  GENERAL: NAD, AAOx3  HEAD: AT/NC  EYES: conjunctiva and sclera clear  NECK: supple, No JVD noted, Normal thyroid  CHEST/LUNG: CTABL; no rales, rhonchi, wheezing, or rubs  HEART: regular rate and rhythm; no murmurs, rubs, or gallops  ABDOMEN: soft, nontender, nondistended; Bowel sounds present  EXTREMITIES:  2+ Peripheral Pulses, No clubbing, cyanosis, or edema  SKIN: warm dry                          12.8   4.71  )-----------( 235      ( 09 Mar 2021 06:49 )             40.1     03-09    143  |  106  |  11  ----------------------------<  99  3.6   |  30  |  0.70    Ca    8.7      09 Mar 2021 06:49  Phos  4.0     03-09  Mg     2.1     03-09    TPro  6.6  /  Alb  3.3<L>  /  TBili  0.5  /  DBili  x   /  AST  98<H>  /  ALT  448<H>  /  AlkPhos  271<H>  03-09    LIVER FUNCTIONS - ( 09 Mar 2021 06:49 )  Alb: 3.3 g/dL / Pro: 6.6 g/dL / ALK PHOS: 271 U/L / ALT: 448 U/L DA / AST: 98 U/L / GGT: x               PT/INR - ( 09 Mar 2021 06:49 )   PT: 11.8 sec;   INR: 0.99 ratio         PTT - ( 09 Mar 2021 06:49 )  PTT:33.0 sec  COVID-19 PCR: NotDetec (07 Mar 2021 21:18)      CAPILLARY BLOOD GLUCOSE          RADIOLOGY & ADDITIONAL TESTS:

## 2021-03-09 NOTE — PROGRESS NOTE ADULT - PROBLEM SELECTOR PLAN 2
abdominal pain improved - resolving gallstone pancreatitis  tolerating advancing diet  pain management prn

## 2021-03-10 ENCOUNTER — TRANSCRIPTION ENCOUNTER (OUTPATIENT)
Age: 51
End: 2021-03-10

## 2021-03-10 LAB
ALBUMIN SERPL ELPH-MCNC: 3.6 G/DL — SIGNIFICANT CHANGE UP (ref 3.5–5)
ALP SERPL-CCNC: 244 U/L — HIGH (ref 40–120)
ALT FLD-CCNC: 354 U/L DA — HIGH (ref 10–60)
ANION GAP SERPL CALC-SCNC: 5 MMOL/L — SIGNIFICANT CHANGE UP (ref 5–17)
AST SERPL-CCNC: 50 U/L — HIGH (ref 10–40)
BASOPHILS # BLD AUTO: 0.02 K/UL — SIGNIFICANT CHANGE UP (ref 0–0.2)
BASOPHILS NFR BLD AUTO: 0.4 % — SIGNIFICANT CHANGE UP (ref 0–2)
BILIRUB SERPL-MCNC: 0.4 MG/DL — SIGNIFICANT CHANGE UP (ref 0.2–1.2)
BUN SERPL-MCNC: 11 MG/DL — SIGNIFICANT CHANGE UP (ref 7–18)
CALCIUM SERPL-MCNC: 9.6 MG/DL — SIGNIFICANT CHANGE UP (ref 8.4–10.5)
CHLORIDE SERPL-SCNC: 106 MMOL/L — SIGNIFICANT CHANGE UP (ref 96–108)
CO2 SERPL-SCNC: 31 MMOL/L — SIGNIFICANT CHANGE UP (ref 22–31)
CREAT SERPL-MCNC: 0.66 MG/DL — SIGNIFICANT CHANGE UP (ref 0.5–1.3)
EOSINOPHIL # BLD AUTO: 0.16 K/UL — SIGNIFICANT CHANGE UP (ref 0–0.5)
EOSINOPHIL NFR BLD AUTO: 3.3 % — SIGNIFICANT CHANGE UP (ref 0–6)
GLUCOSE SERPL-MCNC: 94 MG/DL — SIGNIFICANT CHANGE UP (ref 70–99)
HCT VFR BLD CALC: 40.4 % — SIGNIFICANT CHANGE UP (ref 34.5–45)
HGB BLD-MCNC: 13.3 G/DL — SIGNIFICANT CHANGE UP (ref 11.5–15.5)
IMM GRANULOCYTES NFR BLD AUTO: 0.2 % — SIGNIFICANT CHANGE UP (ref 0–1.5)
LYMPHOCYTES # BLD AUTO: 2.17 K/UL — SIGNIFICANT CHANGE UP (ref 1–3.3)
LYMPHOCYTES # BLD AUTO: 44.4 % — HIGH (ref 13–44)
MAGNESIUM SERPL-MCNC: 2.3 MG/DL — SIGNIFICANT CHANGE UP (ref 1.6–2.6)
MCHC RBC-ENTMCNC: 30.2 PG — SIGNIFICANT CHANGE UP (ref 27–34)
MCHC RBC-ENTMCNC: 32.9 GM/DL — SIGNIFICANT CHANGE UP (ref 32–36)
MCV RBC AUTO: 91.8 FL — SIGNIFICANT CHANGE UP (ref 80–100)
MONOCYTES # BLD AUTO: 0.29 K/UL — SIGNIFICANT CHANGE UP (ref 0–0.9)
MONOCYTES NFR BLD AUTO: 5.9 % — SIGNIFICANT CHANGE UP (ref 2–14)
NEUTROPHILS # BLD AUTO: 2.24 K/UL — SIGNIFICANT CHANGE UP (ref 1.8–7.4)
NEUTROPHILS NFR BLD AUTO: 45.8 % — SIGNIFICANT CHANGE UP (ref 43–77)
NRBC # BLD: 0 /100 WBCS — SIGNIFICANT CHANGE UP (ref 0–0)
PHOSPHATE SERPL-MCNC: 4.3 MG/DL — SIGNIFICANT CHANGE UP (ref 2.5–4.5)
PLATELET # BLD AUTO: 256 K/UL — SIGNIFICANT CHANGE UP (ref 150–400)
POTASSIUM SERPL-MCNC: 3.9 MMOL/L — SIGNIFICANT CHANGE UP (ref 3.5–5.3)
POTASSIUM SERPL-SCNC: 3.9 MMOL/L — SIGNIFICANT CHANGE UP (ref 3.5–5.3)
PROT SERPL-MCNC: 7 G/DL — SIGNIFICANT CHANGE UP (ref 6–8.3)
RBC # BLD: 4.4 M/UL — SIGNIFICANT CHANGE UP (ref 3.8–5.2)
RBC # FLD: 12.7 % — SIGNIFICANT CHANGE UP (ref 10.3–14.5)
SARS-COV-2 RNA SPEC QL NAA+PROBE: SIGNIFICANT CHANGE UP
SODIUM SERPL-SCNC: 142 MMOL/L — SIGNIFICANT CHANGE UP (ref 135–145)
WBC # BLD: 4.89 K/UL — SIGNIFICANT CHANGE UP (ref 3.8–10.5)
WBC # FLD AUTO: 4.89 K/UL — SIGNIFICANT CHANGE UP (ref 3.8–10.5)

## 2021-03-10 PROCEDURE — 99232 SBSQ HOSP IP/OBS MODERATE 35: CPT | Mod: GC

## 2021-03-10 PROCEDURE — 99232 SBSQ HOSP IP/OBS MODERATE 35: CPT | Mod: 57

## 2021-03-10 RX ORDER — CHLORHEXIDINE GLUCONATE 213 G/1000ML
1 SOLUTION TOPICAL ONCE
Refills: 0 | Status: DISCONTINUED | OUTPATIENT
Start: 2021-03-10 | End: 2021-03-11

## 2021-03-10 RX ORDER — ACETAMINOPHEN 500 MG
650 TABLET ORAL EVERY 6 HOURS
Refills: 0 | Status: DISCONTINUED | OUTPATIENT
Start: 2021-03-10 | End: 2021-03-11

## 2021-03-10 RX ORDER — SODIUM CHLORIDE 9 MG/ML
1000 INJECTION INTRAMUSCULAR; INTRAVENOUS; SUBCUTANEOUS
Refills: 0 | Status: DISCONTINUED | OUTPATIENT
Start: 2021-03-11 | End: 2021-03-11

## 2021-03-10 RX ADMIN — PANTOPRAZOLE SODIUM 40 MILLIGRAM(S): 20 TABLET, DELAYED RELEASE ORAL at 11:42

## 2021-03-10 NOTE — DIETITIAN INITIAL EVALUATION ADULT. - PROBLEM SELECTOR PLAN 3
IMPROVE VTE Individual Risk Assessment  RISK                                                         Points  [  ] Previous VTE                                      3  [  ] Thrombophilia                                   2  [  ] Lower limb paralysis                         2 (unable to hold up >15 seconds)    [  ] Current Cancer                                  2       (within 6 months)  [  ] Immobilization > 24 hrs                    1  [  ] ICU/CCU stay > 24 hrs                         1  [  ] Age > 60                                              1   cw lovenox for DVT ppx plan per MD

## 2021-03-10 NOTE — DIETITIAN INITIAL EVALUATION ADULT. - PROBLEM SELECTOR PLAN 1
Abdominal Pain possibly due to Gallstone Pancreatitis:  -Ultrasound Abdomen identified cholelithiasis. No pericholecystic fluid or significant wall thickening is seen. Sonographic Martines's sign was reportedly negative. Partially visualized pancreas appears unremarkable without peripancreatic fluid.  -CT Abdomen/Pelvis with IV contrast identified Suggestion of gallstones as better delineated on recent right upper quadrant ultrasound.  Hyperemic thickened gallbladder wall with trace surrounding stranding.  Pancreas within normal limits  -Lipase= 7,299  -BISAP Score= 0, Patients with a BISAP Score of 0 had <1% risk of mortality, and one study stratified patients with a score =2, given a mortality risk of 1.9%.  -fu Alcohol, UDS still pending in ED  -fu Amylase, Lipid Panel, GGT, Vitamin D, ESR, Procalcitonin   -send Auto-immune work up after initial work up is completed   -NPO for now  -Will start Lactated Ringer at a rate of 200cc  -Will ask GI to evaluate patient for possible MRCP/ HIDA scan   -GI Dr. Jo consulted Abdominal Pain possibly due to Gallstone Pancreatitis:  plan per MD

## 2021-03-10 NOTE — PROGRESS NOTE ADULT - SUBJECTIVE AND OBJECTIVE BOX
INTERVAL HPI/OVERNIGHT EVENTS:  Patient seen and examined.   Pt resting comfortably. No acute complaints.   Tolerating clear liquid diet.   Denies N/V, denies abd pain     MEDICATIONS  (STANDING):  enoxaparin Injectable 40 milliGRAM(s) SubCutaneous daily  influenza   Vaccine 0.5 milliLiter(s) IntraMuscular once  lactated ringers. 1000 milliLiter(s) (200 mL/Hr) IV Continuous <Continuous>  pantoprazole  Injectable 40 milliGRAM(s) IV Push daily    MEDICATIONS  (PRN):  ibuprofen  Tablet. 600 milliGRAM(s) Oral every 6 hours PRN Mild Pain (1 - 3)  ondansetron Injectable 4 milliGRAM(s) IV Push every 6 hours PRN Nausea and/or Vomiting      Vital Signs Last 24 Hrs  T(C): 36.4 (10 Mar 2021 05:19), Max: 36.4 (10 Mar 2021 05:19)  T(F): 97.5 (10 Mar 2021 05:19), Max: 97.5 (10 Mar 2021 05:19)  HR: 57 (10 Mar 2021 05:19) (55 - 78)  BP: 114/63 (10 Mar 2021 05:19) (114/63 - 122/71)  BP(mean): --  RR: 16 (10 Mar 2021 05:19) (16 - 18)  SpO2: 98% (10 Mar 2021 05:19) (95% - 98%)    Physical:  General: A&Ox3. NAD.  Respirations: Unlabored   Abdomen: Soft nondistended, nontender, no rebound, no guarding     I&O's Detail      LABS:                        13.3   4.89  )-----------( 256      ( 10 Mar 2021 07:05 )             40.4             03-10    142  |  106  |  11  ----------------------------<  94  3.9   |  31  |  0.66    Ca    9.6      10 Mar 2021 07:05  Phos  4.3     03-10  Mg     2.3     03-10    TPro  7.0  /  Alb  3.6  /  TBili  0.4  /  DBili  x   /  AST  50<H>  /  ALT  354<H>  /  AlkPhos  244<H>  03-10

## 2021-03-10 NOTE — DIETITIAN INITIAL EVALUATION ADULT. - PERTINENT LABORATORY DATA
03-10 Na142 mmol/L Glu 94 mg/dL K+ 3.9 mmol/L Cr  0.66 mg/dL BUN 11 mg/dL   03-10 Phos 4.3 mg/dL   03-10 Alb 3.6 g/dL       03-08 Chol 218 mg/dL<H> LDL --    HDL 93 mg/dL Trig 53 mg/dL  03-08-21 @ 11:32 HgbA1C 5.9 [4.0 - 5.6]

## 2021-03-10 NOTE — DIETITIAN INITIAL EVALUATION ADULT. - WEIGHT (LBS)
Occupational Therapy Daily Treatment     Visit Count: 3  Plan of Care Dates:  Initial: 9/4/2018 Through: 11/14/2018  Insurance Information:  VISIT LIMIT: MEDICAL NECESSITY  AUTHORIZATION NEEDED: NO  NOTES: FOLLOW MEDICARE GUIDELINES     Next Referring Provider Visit: 10/9/18     Referred by: Mio Paulson MD  Medical Diagnosis (from order): M25.512 Acute pain of left shoulder   Treatment Diagnosis: Shoulder Symptoms with Pain, Impaired Range of Motion and Impaired Motor Function/Muscle Performance  Insurance: 1. [facility] MEDICARE COMPLETE  2. N/A     Date of onset/injury: 7/29/18  Diagnosis Precautions: none  Chart reviewed: Relevant co-morbidities, allergies, tests and medications: Reviewed in epic:  History of Htn, diabetes mellitus, Fibromyalogia and osteoarthritis   Left MRI results 8/9/18:  IMPRESSION:   1.  Rotator cuff repair with tendinopathy and partial tears of the  supraspinatus and infraspinatus. Tendinopathy of the subscapularis. Rotator  cuff calcific tendinosis as well.  2.  Mild acromioclavicular arthropathy with subacromial outlet narrowing.  3.  Degenerative changes of the posteroinferior labrum and possibly  posterosuperior labrum. Evaluation is limited by lack of intra-articular  contrast.     SUBJECTIVE     Current Pain: 3-4/10.    Functional Change: Noted improved ease with reaching over head    OBJECTIVE   Range of Motion (degrees)    Norm Left Right   Shoulder                    Date   Initial Initial   Flexion 170-180 103*  P115* 140   Extension 50-60 36* 57*   Abduction 170-180       Adduction 50-75 105*  P110* 135   Internal Rotation @ 90 abduction sitting   43* 30   External  Rotation @ 90 abduction sitting 80-90 50* 80   standard testing positions unless otherwise noted; Key: ranges are reported in active range of motion unless noted as AA=active assistive or P=passive range of motion, * denotes pain   Comments: elbow, forearm, wrist and hand arom is WFL     Scapular Assessment  Left Right   Resting Position elevated, anterior tilt anterior tilt   Scapulohumeral Rhythm impaired within functional limits   Comments: none; * denotes pain      Strength (out of 5)    Left Right   Date Initial Initial   Shoulder Flexion 3- 4-*   Shoulder Extension 5 5   Shoulder Abduction 3- 4+   Shoulder Adduction 4* 5   Shoulder Internal Rotation 4 5   Shoulder External Rotation 4-* 4   Elbow Flexion 5* 5   Elbow Extension 4 4*   standard testing positions unless otherwise noted,* denotes pain  Comments: none      Treatment   Therapeutic Exercise:   Exercise Repetitions Sets Position/Cues   Shoulder IR countertop stretch  10 1    Shoulder blade squeezes 10 2    Shoulder IR cane stretch to low back 10 1    Shoulder IR cane stretch to opposite hip with right hand control 10 1    Finger ladder: shoulder flexion, shoulder scaption 5 each 1 Stretch noted to inferior shoulder into chest well with scaption stretch  Cues to prevent compensations                           Comments:     Manual Therapy:   Soft tissue mobilization shoulder and proximal upper arm, pectoralis major/minor, [id number] and distal biceps - softening noted post treatment  PROM to left shoulder in all planes, noted improved ranges with reps - most limited in shoulder abduction d/t pain and developing capsular tightness  pectoralis major stretch supine to plane of scaption    Therapeutic Activity:  none    Moist Heat (40240): Pt declined hot pack this date    Initial Home Program:  * above denotes home program issuance as well  Exercise: Date issued Date DC Comments   Scapular squeezes bilateral x 10   Cane ER seated with shoulder @ 0 abduction bilateral x 10  Towel slides shoulder flexion on table x 10  Towel slides shoulder Rotation bilateral on table x 10 9/4/18       Cane ER/IR with shoulders at 90 flexion and elbows extended   Cane chest press bilateral with 0#  Codman's Exercises Standing:  Forward/backward and circular motion both ways x 10  each 9/6/18       Countertop shoulder IR stretch  Wall slides: shoulder flexion and scaption 9/14/18                             ASSESSMENT    Pt with remaining pain and soft tissue tightness to proximal and distal biceps, softening and improved comfort reported post manual work of session. Pt improving with passive and active shoulder AROM, but significant compensations and shoulder hiking remains producing pain with motion. Patient would benefit from continued OT 1x/week  to address remaining deficits and improve overall functional use.  Patient remains highly motivated.    Pain after treatment: 0/10 Pt able to bring shoulder behind back with increased range and ease to don sweater \"This is amazing!\"  Result of above outlined education: Verbalizes understanding, Demonstrates understanding and Needs reinforcement    Goals:        To be obtained by end of this plan of care:  1. Patient independent with modified and progressed home exercise program.  2. Patient will decrease involved shoulder pain/symptoms to 0-3/10  to aid in normalization of upper extremity movements to aid activities of independent daily living.   3. Patient will increase involved shoulder active range of motion to extension 43°, internal rotation 50°, external rotation 70°, abduction/flexion 130° to aid in completion of self-care tasks/dressing.   4. Patient will increase involved shoulder strength to 4-/5 to aid in completion of household tasks for independent living.  5.  DASH: Patient will complete form to reflect an improved score from initial score of 23.5 to less than or equal to 15 (scored 0-100; a higher score indicates greater disability) to indicate pt reported improvement in function/disability/impairment (minimal detectable change: 15 points).    GOAL STATUS:  ongoing unless indicated above    PLAN     Soft tissue mobilization shoulder, upper arm, pectoralis major/minor and upper trapezius  Glenohumeral  joint mobilization posterior  and posterior/inferior grade 2  Scapular mobs and isometrics  GH isometrics  Review hep  Wall slides/pulleys  Active assistive scaption from  from arm on physioball on wendi table x 5-10  Finger ladder  Bannister slides in shoulder flexion and abduction  Postural corrections  1st rib mobs  Bilateral cane IR standing with elbow straight combined with adduction with 20 second hold x 5 reps  Bilateral IR sliding cane up towards low back with 20 second hold x 5  Consider continuing Moist hot pack post treatment to left shoulder as needed    THERAPY DAILY BILLING   Primary Insurance:  [facility] MEDICARE COMPLETE  Secondary Insurance: N/A    Evaluation Procedures:  No evaluation codes were used on this date of service    Timed Procedures:  Manual Therapy, 20 minutes  Therapeutic Exercise, 20 minutes    Untimed Procedures:  No untimed codes were used on this date of service    Total Treatment Time: 55 minutes    G-Code:  G-Code Score ABN form  reporting not required this treatment session  Modifier based on outcome measure(s)/functional testing/clinical judgement as listed above    The referring provider's electronic or written signature on the evaluation authorizes the therapy plan of care and certifies the need for these services, furnished under this plan of care while under their care.  Referring provider signature on file      141.9

## 2021-03-10 NOTE — PROGRESS NOTE ADULT - PROBLEM SELECTOR PLAN 3
- improving    - likely 2/2 gallstone  - tbili 1.9 >0.8>0.8  - d.bili 0.3>0.3  - LFT trending down  - monitor LFTs daily

## 2021-03-10 NOTE — DIETITIAN INITIAL EVALUATION ADULT. - PROBLEM SELECTOR PLAN 2
Pt noted to have elevated LFT  - fu GGT and Hepatitis Panel  - bili elevated; fu total and direct bilirubin  - fu GI plan per MD

## 2021-03-10 NOTE — PROGRESS NOTE ADULT - PROBLEM SELECTOR PLAN 1
- p/w epigastic pain   - callie 2/2 Gallstone Pancreatitis  - BAL <3  - lipase 7,299 >540  - U/S abd cholelithiasis  - CT A/P gallstones, but pancreas within normal limits  - BISAP Score= 0  - MRCP small gallstones w/ wall thickening/edema and/or trace pericholecystic fluid  - c/w pain management and oral intake  - plan for elective cholecystectomy on 3/11    - GI, Dr. Jo, onboard  - surgery onboard

## 2021-03-10 NOTE — PROGRESS NOTE ADULT - SUBJECTIVE AND OBJECTIVE BOX
PGY-1 Progress Note discussed with attending    PAGER #: [[phone]] TILL 5:00 PM  PLEASE CONTACT ON CALL TEAM:  - On Call Team (Please refer to Jatin) FROM 5:00 PM - 8:30PM  - Nightfloat Team FROM 8:30 -7:30 AM    INTERVAL HPI  - CT abd showed gallstone without any peripancreatic changes. Lipase noted to be 7299. Lipase and LFTs trending down. Patient remains asymptomatic and tolerating clear liquid diet. Patient is planned for elective cholecystectomy on 3/11    OVERNIGHT EVENTS:   - No acute events overnight. Patient reports no complaints or discomfort. Denies abdominal pain, bowel movement or urinary changes.    REVIEW OF SYSTEMS:  CONSTITUTIONAL: No fever, weight loss, or fatigue  RESPIRATORY: No cough, wheezing, chills or hemoptysis; No shortness of breath  CARDIOVASCULAR: No chest pain, palpitations, dizziness, or leg swelling  GASTROINTESTINAL: No abdominal pain. No nausea, vomiting, or hematemesis; No diarrhea or constipation. No melena or hematochezia.  GENITOURINARY: No dysuria or hematuria, urinary frequency  NEUROLOGICAL: No headaches, memory loss, loss of strength, numbness, or tremors  SKIN: No itching, burning, rashes, or lesions     MEDICATIONS  (STANDING):  chlorhexidine 2% Cloths 1 Application(s) Topical once  influenza   Vaccine 0.5 milliLiter(s) IntraMuscular once  lactated ringers. 1000 milliLiter(s) (200 mL/Hr) IV Continuous <Continuous>  pantoprazole  Injectable 40 milliGRAM(s) IV Push daily    MEDICATIONS  (PRN):  ibuprofen  Tablet. 600 milliGRAM(s) Oral every 6 hours PRN Mild Pain (1 - 3)  ondansetron Injectable 4 milliGRAM(s) IV Push every 6 hours PRN Nausea and/or Vomiting      Vital Signs Last 24 Hrs  T(C): 36.4 (10 Mar 2021 05:19), Max: 36.4 (10 Mar 2021 05:19)  T(F): 97.5 (10 Mar 2021 05:19), Max: 97.5 (10 Mar 2021 05:19)  HR: 57 (10 Mar 2021 05:19) (55 - 78)  BP: 114/63 (10 Mar 2021 05:19) (114/63 - 122/71)  BP(mean): --  RR: 16 (10 Mar 2021 05:19) (16 - 18)  SpO2: 98% (10 Mar 2021 05:19) (95% - 98%)    PHYSICAL EXAMINATION:  GENERAL: NAD, AAOx3  HEAD: AT/NC  EYES: conjunctiva and sclera clear  NECK: supple, No JVD noted, Normal thyroid  CHEST/LUNG: CTABL; no rales, rhonchi, wheezing, or rubs  HEART: regular rate and rhythm; no murmurs, rubs, or gallops  ABDOMEN: soft, nontender, nondistended; Bowel sounds present  EXTREMITIES:  2+ Peripheral Pulses, No clubbing, cyanosis, or edema  SKIN: warm dry                          13.3   4.89  )-----------( 256      ( 10 Mar 2021 07:05 )             40.4     03-10    142  |  106  |  11  ----------------------------<  94  3.9   |  31  |  0.66    Ca    9.6      10 Mar 2021 07:05  Phos  4.3     03-10  Mg     2.3     03-10    TPro  7.0  /  Alb  3.6  /  TBili  0.4  /  DBili  x   /  AST  50<H>  /  ALT  354<H>  /  AlkPhos  244<H>  03-10    LIVER FUNCTIONS - ( 10 Mar 2021 07:05 )  Alb: 3.6 g/dL / Pro: 7.0 g/dL / ALK PHOS: 244 U/L / ALT: 354 U/L DA / AST: 50 U/L / GGT: x               PT/INR - ( 09 Mar 2021 06:49 )   PT: 11.8 sec;   INR: 0.99 ratio         PTT - ( 09 Mar 2021 06:49 )  PTT:33.0 sec  COVID-19 PCR: NotDetec (07 Mar 2021 21:18)      CAPILLARY BLOOD GLUCOSE          RADIOLOGY & ADDITIONAL TESTS:

## 2021-03-10 NOTE — PROGRESS NOTE ADULT - ASSESSMENT
52 y/o female with resolving gallstone pancreatitis     -OR tomorrow 3/11 for laparoscopic cholecystectomy possible open   -NPO after midnight except PO meds  -IVF when NPO  -Chlorhexidine wipes  -PREOP labs  -active COVID test within the last 48 hours   -DVT ppx  -Pain control PRN   -Consent to be obtained  52 y/o female with resolving gallstone pancreatitis     -OR tomorrow 3/11 for laparoscopic cholecystectomy possible open   -NPO after midnight except PO meds  -IVF when NPO  -Chlorhexidine wipes  -PREOP labs  -active COVID test within the last 48 hours   -DVT ppx  -Pain control PRN   -Consent to be obtained   -Rec advance to low fat diet, NPO at midnight

## 2021-03-10 NOTE — DIETITIAN INITIAL EVALUATION ADULT. - OTHER INFO
Pt lives home with family PTA, alert, oriented, Stateless speaking, contacted via [employer]  ID # [id number], well-communicated; decreased intake x 1wk PTA due to abdominal pain, denied wt loss, but wt gain 5 lb x 6m, denied GI distress at present, no specific food allergy or intolerance; NPO/Clear Liquid diet x 3 to 4d in-house, OR tomorrow 3/11/2021 for laparoscopic cholecystectomy possible open per Surgery Pt lives home with family PTA, alert, oriented, Israeli speaking, contacted via [employer]  ID # [phone], well-communicated; decreased intake x 1wk PTA due to abdominal pain, denied wt loss, but wt gain 5 lb x 6m, denied GI distress at present, no specific food allergy or intolerance; NPO/Clear Liquid diet x 3 to 4d in-house, tolerating diet reported; OR tomorrow 3/11/2021 for laparoscopic cholecystectomy possible open per Surgery

## 2021-03-10 NOTE — DIETITIAN INITIAL EVALUATION ADULT. - PERTINENT MEDS FT
MEDICATIONS  (STANDING):  chlorhexidine 2% Cloths 1 Application(s) Topical once  influenza   Vaccine 0.5 milliLiter(s) IntraMuscular once  lactated ringers. 1000 milliLiter(s) (200 mL/Hr) IV Continuous <Continuous>  pantoprazole  Injectable 40 milliGRAM(s) IV Push daily

## 2021-03-11 ENCOUNTER — RESULT REVIEW (OUTPATIENT)
Age: 51
End: 2021-03-11

## 2021-03-11 LAB
ABO RH CONFIRMATION: SIGNIFICANT CHANGE UP
ALBUMIN SERPL ELPH-MCNC: 3.5 G/DL — SIGNIFICANT CHANGE UP (ref 3.5–5)
ALP SERPL-CCNC: 218 U/L — HIGH (ref 40–120)
ALT FLD-CCNC: 290 U/L DA — HIGH (ref 10–60)
ANION GAP SERPL CALC-SCNC: 4 MMOL/L — LOW (ref 5–17)
APTT BLD: 34 SEC — SIGNIFICANT CHANGE UP (ref 27.5–35.5)
AST SERPL-CCNC: 61 U/L — HIGH (ref 10–40)
BASOPHILS # BLD AUTO: 0.01 K/UL — SIGNIFICANT CHANGE UP (ref 0–0.2)
BASOPHILS NFR BLD AUTO: 0.2 % — SIGNIFICANT CHANGE UP (ref 0–2)
BILIRUB SERPL-MCNC: 0.4 MG/DL — SIGNIFICANT CHANGE UP (ref 0.2–1.2)
BLD GP AB SCN SERPL QL: SIGNIFICANT CHANGE UP
BUN SERPL-MCNC: 9 MG/DL — SIGNIFICANT CHANGE UP (ref 7–18)
CALCIUM SERPL-MCNC: 9.2 MG/DL — SIGNIFICANT CHANGE UP (ref 8.4–10.5)
CHLORIDE SERPL-SCNC: 109 MMOL/L — HIGH (ref 96–108)
CO2 SERPL-SCNC: 30 MMOL/L — SIGNIFICANT CHANGE UP (ref 22–31)
CREAT SERPL-MCNC: 0.71 MG/DL — SIGNIFICANT CHANGE UP (ref 0.5–1.3)
EOSINOPHIL # BLD AUTO: 0.16 K/UL — SIGNIFICANT CHANGE UP (ref 0–0.5)
EOSINOPHIL NFR BLD AUTO: 3 % — SIGNIFICANT CHANGE UP (ref 0–6)
GLUCOSE SERPL-MCNC: 90 MG/DL — SIGNIFICANT CHANGE UP (ref 70–99)
HCT VFR BLD CALC: 41.3 % — SIGNIFICANT CHANGE UP (ref 34.5–45)
HGB BLD-MCNC: 13.6 G/DL — SIGNIFICANT CHANGE UP (ref 11.5–15.5)
IMM GRANULOCYTES NFR BLD AUTO: 0 % — SIGNIFICANT CHANGE UP (ref 0–1.5)
INR BLD: 1 RATIO — SIGNIFICANT CHANGE UP (ref 0.88–1.16)
LYMPHOCYTES # BLD AUTO: 2.58 K/UL — SIGNIFICANT CHANGE UP (ref 1–3.3)
LYMPHOCYTES # BLD AUTO: 47.7 % — HIGH (ref 13–44)
MAGNESIUM SERPL-MCNC: 2.1 MG/DL — SIGNIFICANT CHANGE UP (ref 1.6–2.6)
MCHC RBC-ENTMCNC: 30.2 PG — SIGNIFICANT CHANGE UP (ref 27–34)
MCHC RBC-ENTMCNC: 32.9 GM/DL — SIGNIFICANT CHANGE UP (ref 32–36)
MCV RBC AUTO: 91.6 FL — SIGNIFICANT CHANGE UP (ref 80–100)
MONOCYTES # BLD AUTO: 0.35 K/UL — SIGNIFICANT CHANGE UP (ref 0–0.9)
MONOCYTES NFR BLD AUTO: 6.5 % — SIGNIFICANT CHANGE UP (ref 2–14)
NEUTROPHILS # BLD AUTO: 2.31 K/UL — SIGNIFICANT CHANGE UP (ref 1.8–7.4)
NEUTROPHILS NFR BLD AUTO: 42.6 % — LOW (ref 43–77)
NRBC # BLD: 0 /100 WBCS — SIGNIFICANT CHANGE UP (ref 0–0)
PHOSPHATE SERPL-MCNC: 4.6 MG/DL — HIGH (ref 2.5–4.5)
PLATELET # BLD AUTO: 252 K/UL — SIGNIFICANT CHANGE UP (ref 150–400)
POTASSIUM SERPL-MCNC: 4.2 MMOL/L — SIGNIFICANT CHANGE UP (ref 3.5–5.3)
POTASSIUM SERPL-SCNC: 4.2 MMOL/L — SIGNIFICANT CHANGE UP (ref 3.5–5.3)
PROT SERPL-MCNC: 7 G/DL — SIGNIFICANT CHANGE UP (ref 6–8.3)
PROTHROM AB SERPL-ACNC: 11.9 SEC — SIGNIFICANT CHANGE UP (ref 10.6–13.6)
RBC # BLD: 4.51 M/UL — SIGNIFICANT CHANGE UP (ref 3.8–5.2)
RBC # FLD: 12.6 % — SIGNIFICANT CHANGE UP (ref 10.3–14.5)
SODIUM SERPL-SCNC: 143 MMOL/L — SIGNIFICANT CHANGE UP (ref 135–145)
WBC # BLD: 5.41 K/UL — SIGNIFICANT CHANGE UP (ref 3.8–10.5)
WBC # FLD AUTO: 5.41 K/UL — SIGNIFICANT CHANGE UP (ref 3.8–10.5)

## 2021-03-11 PROCEDURE — 99232 SBSQ HOSP IP/OBS MODERATE 35: CPT

## 2021-03-11 PROCEDURE — 88304 TISSUE EXAM BY PATHOLOGIST: CPT | Mod: 26

## 2021-03-11 PROCEDURE — 47563 LAPARO CHOLECYSTECTOMY/GRAPH: CPT

## 2021-03-11 PROCEDURE — 99232 SBSQ HOSP IP/OBS MODERATE 35: CPT | Mod: GC

## 2021-03-11 RX ORDER — CEFOTETAN DISODIUM 1 G
2 VIAL (EA) INJECTION EVERY 12 HOURS
Refills: 0 | Status: DISCONTINUED | OUTPATIENT
Start: 2021-03-11 | End: 2021-03-13

## 2021-03-11 RX ORDER — SODIUM CHLORIDE 9 MG/ML
1000 INJECTION, SOLUTION INTRAVENOUS
Refills: 0 | Status: DISCONTINUED | OUTPATIENT
Start: 2021-03-11 | End: 2021-03-13

## 2021-03-11 RX ORDER — ENOXAPARIN SODIUM 100 MG/ML
40 INJECTION SUBCUTANEOUS DAILY
Refills: 0 | Status: DISCONTINUED | OUTPATIENT
Start: 2021-03-12 | End: 2021-03-12

## 2021-03-11 RX ORDER — HYDROMORPHONE HYDROCHLORIDE 2 MG/ML
1 INJECTION INTRAMUSCULAR; INTRAVENOUS; SUBCUTANEOUS
Refills: 0 | Status: DISCONTINUED | OUTPATIENT
Start: 2021-03-11 | End: 2021-03-11

## 2021-03-11 RX ORDER — INDOMETHACIN 50 MG
50 CAPSULE ORAL ONCE
Refills: 0 | Status: COMPLETED | OUTPATIENT
Start: 2021-03-12 | End: 2021-03-12

## 2021-03-11 RX ORDER — HYDROMORPHONE HYDROCHLORIDE 2 MG/ML
0.5 INJECTION INTRAMUSCULAR; INTRAVENOUS; SUBCUTANEOUS
Refills: 0 | Status: DISCONTINUED | OUTPATIENT
Start: 2021-03-11 | End: 2021-03-11

## 2021-03-11 RX ORDER — DEXAMETHASONE 0.5 MG/5ML
6 ELIXIR ORAL DAILY
Refills: 0 | Status: DISCONTINUED | OUTPATIENT
Start: 2021-03-11 | End: 2021-03-11

## 2021-03-11 RX ORDER — SODIUM CHLORIDE 9 MG/ML
1000 INJECTION, SOLUTION INTRAVENOUS
Refills: 0 | Status: DISCONTINUED | OUTPATIENT
Start: 2021-03-11 | End: 2021-03-11

## 2021-03-11 RX ORDER — ONDANSETRON 8 MG/1
4 TABLET, FILM COATED ORAL EVERY 6 HOURS
Refills: 0 | Status: DISCONTINUED | OUTPATIENT
Start: 2021-03-11 | End: 2021-03-13

## 2021-03-11 RX ORDER — OXYCODONE AND ACETAMINOPHEN 5; 325 MG/1; MG/1
1 TABLET ORAL EVERY 4 HOURS
Refills: 0 | Status: DISCONTINUED | OUTPATIENT
Start: 2021-03-11 | End: 2021-03-13

## 2021-03-11 RX ORDER — MORPHINE SULFATE 50 MG/1
4 CAPSULE, EXTENDED RELEASE ORAL EVERY 6 HOURS
Refills: 0 | Status: DISCONTINUED | OUTPATIENT
Start: 2021-03-11 | End: 2021-03-13

## 2021-03-11 RX ORDER — PANTOPRAZOLE SODIUM 20 MG/1
40 TABLET, DELAYED RELEASE ORAL DAILY
Refills: 0 | Status: DISCONTINUED | OUTPATIENT
Start: 2021-03-11 | End: 2021-03-13

## 2021-03-11 RX ADMIN — HYDROMORPHONE HYDROCHLORIDE 0.5 MILLIGRAM(S): 2 INJECTION INTRAMUSCULAR; INTRAVENOUS; SUBCUTANEOUS at 10:48

## 2021-03-11 RX ADMIN — Medication 650 MILLIGRAM(S): at 02:31

## 2021-03-11 RX ADMIN — Medication 650 MILLIGRAM(S): at 01:29

## 2021-03-11 RX ADMIN — SODIUM CHLORIDE 100 MILLILITER(S): 9 INJECTION INTRAMUSCULAR; INTRAVENOUS; SUBCUTANEOUS at 00:01

## 2021-03-11 RX ADMIN — Medication 100 GRAM(S): at 17:57

## 2021-03-11 RX ADMIN — PANTOPRAZOLE SODIUM 40 MILLIGRAM(S): 20 TABLET, DELAYED RELEASE ORAL at 13:15

## 2021-03-11 RX ADMIN — SODIUM CHLORIDE 75 MILLILITER(S): 9 INJECTION, SOLUTION INTRAVENOUS at 13:15

## 2021-03-11 RX ADMIN — HYDROMORPHONE HYDROCHLORIDE 0.5 MILLIGRAM(S): 2 INJECTION INTRAMUSCULAR; INTRAVENOUS; SUBCUTANEOUS at 10:33

## 2021-03-11 NOTE — PROGRESS NOTE ADULT - PROBLEM SELECTOR PLAN 1
s/p lap milton today with IOC showing debris in distal CBD with no flow into the duodenum  will Plan for ERCP tomorrow to evaluate distal CBD  LFT's continues to trend down  NPO after MN  abx as per sx  continue to trend LFT's s/p lap milton today with IOC showing debris in distal CBD with no flow into the duodenum  will Plan for ERCP tomorrow to evaluate distal CBD  indomethacin supp x 1 in am  LFT's continues to trend down  NPO after MN  abx as per sx  continue to trend LFT's s/p lap milton today with IOC showing debris in distal CBD with no flow into the duodenum  will Plan for ERCP tomorrow to evaluate distal CBD  Hold lovenox  indomethacin supp x 1 in am  LFT's continues to trend down  NPO after MN  abx as per sx  continue to trend LFT's

## 2021-03-11 NOTE — PROGRESS NOTE ADULT - PROBLEM SELECTOR PLAN 1
- p/w epigastic pain   - callie 2/2 Gallstone Pancreatitis  - BAL <3  - lipase 7,299 >540  - U/S abd cholelithiasis  - CT A/P gallstones, but pancreas within normal limits  - BISAP Score= 0  - MRCP small gallstones w/ wall thickening/edema and/or trace pericholecystic fluid  - s/p cholecystectomy  - c/w pain management and oral intake  - plan for ERCP on 3/12 (for distal CBD blockage)    - GI, Dr. Jo, onboard  - surgery onboard - p/w epigastic pain   - callie 2/2 Gallstone Pancreatitis  - BAL <3  - lipase 7,299 >540  - U/S abd cholelithiasis  - CT A/P gallstones, but pancreas within normal limits  - BISAP Score= 0  - MRCP small gallstones w/ wall thickening/edema and/or trace pericholecystic fluid  - s/p cholecystectomy  - c/w cefotetan pain management, and oral intake  - plan for ERCP on 3/12 (for distal CBD blockage)  - NPO tonight    - GI, Dr. Jo, onboard  - surgery onboard

## 2021-03-11 NOTE — PROGRESS NOTE ADULT - SUBJECTIVE AND OBJECTIVE BOX
S/P laparoscopic cholecystectomy with + IOC POD#0  51y old Female seen and examined at bedside. Admits to abdominal pain well controlled with medication.   Denies chest pain, shortness of breath, current nausea/ vomiting, and dizziness.     Vital Signs Last 24 Hrs  T(F): 97.1 (03-11-21 @ 14:41), Max: 97.9 (03-11-21 @ 09:48)  HR: 87 (03-11-21 @ 14:41)  BP: 113/68 (03-11-21 @ 14:41)  RR: 18 (03-11-21 @ 14:41)  SpO2: 96% (03-11-21 @ 14:41)    GENERAL: Alert, NAD  CHEST/LUNG: respirations nonlabored  HEART: S1S2, Regular rate and rhythm  ABDOMEN: Dressing C/D/I x3, PHILIP with minimal output; soft, Appropriate incisional tenderness, Nondistended  EXTREMITIES:  no calf tenderness, No edema, intermittent compression devices in place bilaterally

## 2021-03-11 NOTE — PROGRESS NOTE ADULT - SUBJECTIVE AND OBJECTIVE BOX
pt seen and examined; resolving gallstone pancreatitis for OR this admission; risks, benefits alternatives discussed all questions answered agrees to proceed.

## 2021-03-11 NOTE — PROGRESS NOTE ADULT - SUBJECTIVE AND OBJECTIVE BOX
PGY-1 Progress Note discussed with attending    PAGER #: [phone]] TILL 5:00 PM  PLEASE CONTACT ON CALL TEAM:  - On Call Team (Please refer to Jatin) FROM 5:00 PM - 8:30PM  - Nightfloat Team FROM 8:30 -7:30 AM    INTERVAL HPI  - CT abd showed gallstone without any peripancreatic changes. Lipase noted to be 7299. Lipase and LFTs trending down. Patient remains asymptomatic and tolerating clear liquid diet. Patient is planned for elective cholecystectomy on 3/11. Patient tolerated cholecystectomy well, but Intraoperative cholangiogram showed distal CBD blockage. FAY drainage was placed, and ERCP will be performed tomorrow (3/12) to remove the blockage    OVERNIGHT EVENTS:   - s/p cholecystectomy. Patient tolerated well. She reports nausea and vomiting with oral fluid intake, but has improved since then. Patient denies fever, chills, chest pain, and sob.    REVIEW OF SYSTEMS:  CONSTITUTIONAL: No fever, weight loss, or fatigue  RESPIRATORY: No cough, wheezing, chills or hemoptysis; No shortness of breath  CARDIOVASCULAR: No chest pain, palpitations, dizziness, or leg swelling  GASTROINTESTINAL: complains of nausea and vomiting; No abdominal pain. No hematemesis; No diarrhea or constipation. No melena or hematochezia.  GENITOURINARY: No dysuria or hematuria, urinary frequency  NEUROLOGICAL: No headaches, memory loss, loss of strength, numbness, or tremors  SKIN: No itching, burning, rashes, or lesions     MEDICATIONS  (STANDING):  cefoTEtan  IVPB 2 Gram(s) IV Intermittent every 12 hours  dextrose 5% + sodium chloride 0.9%. 1000 milliLiter(s) (75 mL/Hr) IV Continuous <Continuous>  influenza   Vaccine 0.5 milliLiter(s) IntraMuscular once  lactated ringers. 1000 milliLiter(s) (200 mL/Hr) IV Continuous <Continuous>  pantoprazole  Injectable 40 milliGRAM(s) IV Push daily    MEDICATIONS  (PRN):  ibuprofen  Tablet. 600 milliGRAM(s) Oral every 6 hours PRN Mild Pain (1 - 3)  morphine  - Injectable 4 milliGRAM(s) IV Push every 6 hours PRN Severe Pain (7 - 10)  ondansetron Injectable 4 milliGRAM(s) IV Push every 6 hours PRN Nausea and/or Vomiting  oxycodone    5 mG/acetaminophen 325 mG 1 Tablet(s) Oral every 4 hours PRN Mild Pain (1 - 3)      Vital Signs Last 24 Hrs  T(C): 36.2 (11 Mar 2021 14:41), Max: 36.6 (11 Mar 2021 09:48)  T(F): 97.1 (11 Mar 2021 14:41), Max: 97.9 (11 Mar 2021 09:48)  HR: 87 (11 Mar 2021 14:41) (52 - 87)  BP: 113/68 (11 Mar 2021 14:41) (100/67 - 115/62)  BP(mean): 69 (11 Mar 2021 11:03) (69 - 86)  RR: 18 (11 Mar 2021 14:41) (12 - 18)  SpO2: 96% (11 Mar 2021 14:41) (95% - 100%)    PHYSICAL EXAMINATION:  GENERAL: NAD, AAOx3  HEAD: AT/NC  EYES: conjunctiva and sclera clear  NECK: supple, No JVD noted, Normal thyroid  CHEST/LUNG: CTABL; no rales, rhonchi, wheezing, or rubs  HEART: regular rate and rhythm; no murmurs, rubs, or gallops  ABDOMEN: soft, nontender, nondistended; Bowel sounds present  EXTREMITIES:  2+ Peripheral Pulses, No clubbing, cyanosis, or edema  SKIN: warm dry; FAY drainage and dressing noted on abdomen                          13.6   5.41  )-----------( 252      ( 11 Mar 2021 06:18 )             41.3     03-11    143  |  109<H>  |  9   ----------------------------<  90  4.2   |  30  |  0.71    Ca    9.2      11 Mar 2021 06:18  Phos  4.6     03-11  Mg     2.1     03-11    TPro  7.0  /  Alb  3.5  /  TBili  0.4  /  DBili  x   /  AST  61<H>  /  ALT  290<H>  /  AlkPhos  218<H>  03-11    LIVER FUNCTIONS - ( 11 Mar 2021 06:18 )  Alb: 3.5 g/dL / Pro: 7.0 g/dL / ALK PHOS: 218 U/L / ALT: 290 U/L DA / AST: 61 U/L / GGT: x               PT/INR - ( 11 Mar 2021 06:18 )   PT: 11.9 sec;   INR: 1.00 ratio         PTT - ( 11 Mar 2021 06:18 )  PTT:34.0 sec  COVID-19 PCR: NotDetec (10 Mar 2021 09:33)  COVID-19 PCR: NotDetec (07 Mar 2021 21:18)      CAPILLARY BLOOD GLUCOSE          RADIOLOGY & ADDITIONAL TESTS:

## 2021-03-11 NOTE — PROGRESS NOTE ADULT - ASSESSMENT
51y old Female s/p laparoscopic cholecystectomy with + IOC POD#0. Stable    - ERCP planning for 3/12  - DVT prophylaxis, Incentive Spirometer, OOB, Ambulating, pain control  - Continue antibiotics   - continue PHILIP until after ERCP  - continue current management per medicine

## 2021-03-11 NOTE — PROGRESS NOTE ADULT - SUBJECTIVE AND OBJECTIVE BOX
GI PROGRESS NOTE    Patient is a 51y old  Female who presents with a chief complaint of abdominal pain, (11 Mar 2021 07:22)      HPI:  50 yo female with no PMH presented with intermitted epigastric abdominal pain for 1 week. Patient states pain is radiating to the back and is associated with nausea. Patient has no previous hx of similar pain. Patient denies taking any medications, drugs, or alcohol. Patient denies CP, sob, diarrhea, constipation, headache, flu like symptoms.  (07 Mar 2021 22:29)          ______________________________________________________________________  PMH/PSH:  PAST MEDICAL & SURGICAL HISTORY:  No pertinent past medical history    H/O: hysterectomy      ______________________________________________________________________  MEDS:  MEDICATIONS  (STANDING):  cefoTEtan  IVPB 2 Gram(s) IV Intermittent every 12 hours  dextrose 5% + sodium chloride 0.9%. 1000 milliLiter(s) (75 mL/Hr) IV Continuous <Continuous>  influenza   Vaccine 0.5 milliLiter(s) IntraMuscular once  lactated ringers. 1000 milliLiter(s) (200 mL/Hr) IV Continuous <Continuous>  pantoprazole  Injectable 40 milliGRAM(s) IV Push daily    MEDICATIONS  (PRN):  ibuprofen  Tablet. 600 milliGRAM(s) Oral every 6 hours PRN Mild Pain (1 - 3)  morphine  - Injectable 4 milliGRAM(s) IV Push every 6 hours PRN Severe Pain (7 - 10)  ondansetron Injectable 4 milliGRAM(s) IV Push every 6 hours PRN Nausea and/or Vomiting  oxycodone    5 mG/acetaminophen 325 mG 1 Tablet(s) Oral every 4 hours PRN Mild Pain (1 - 3)    ______________________________________________________________________  ALL:   Allergies    No Known Allergies    Intolerances      ______________________________________________________________________  SH: Social History:  Alcohol: Denied  Smoking: Denied  Illicit drugs: Denied (07 Mar 2021 22:29)    ______________________________________________________________________  FH:  FAMILY HISTORY:    ______________________________________________________________________  ROS:    CONSTITUTIONAL:  No weight loss, fever, chills, weakness or fatigue.    HEENT:  Eyes:  No visual loss, blurred vision, double vision or yellow sclerae. Ears, Nose, Throat:  No hearing loss, sneezing, congestion, runny nose or sore throat.    SKIN:  No rash or itching.    CARDIOVASCULAR:  No chest pain, chest pressure or chest discomfort. No palpitations or edema.    RESPIRATORY:  No shortness of breath, cough or sputum.    GASTROINTESTINAL:  SEE HPI    GENITOURINARY:  No dysuria, hematuria, urinary frequency    NEUROLOGICAL:  No headache, dizziness, syncope, paralysis, ataxia, numbness or tingling in the extremities. No change in bowel or bladder control.    MUSCULOSKELETAL:  No muscle, back pain, joint pain or stiffness.    HEMATOLOGIC:  No anemia, bleeding or bruising.    LYMPHATICS:  No enlarged nodes. No history of splenectomy.    PSYCHIATRIC:  No history of depression or anxiety.    ENDOCRINOLOGIC:  No reports of sweating, cold or heat intolerance. No polyuria or polydipsia.    ALLERGIES:  No history of asthma, hives, eczema or rhinitis.  ______________________________________________________________________  PHYSICAL EXAM:  T(C): 36.6 (03-11-21 @ 11:03), Max: 36.6 (03-11-21 @ 09:48)  HR: 61 (03-11-21 @ 11:03)  BP: 102/60 (03-11-21 @ 11:03)  RR: 12 (03-11-21 @ 11:03)  SpO2: 95% (03-11-21 @ 11:03)  Wt(kg): --    03-11 - 03-11  --------------------------------------------------------  IN:    Lactated Ringers Bolus: 1400 mL  Total IN: 1400 mL    OUT:  Total OUT: 0 mL    Total NET: 1400 mL          GEN: NAD   CVS- S1 S2  ABD: soft nontender, non distended, bowel sounds+  LUNGS: clear to auscultation  NEURO: noin focal neuro exam; AAO x 3  Extremities: no cyanosis, no calf tenderness, no edema, no clubbing      ______________________________________________________________________  LABS:                        13.6   5.41  )-----------( 252      ( 11 Mar 2021 06:18 )             41.3     03-11    143  |  109<H>  |  9   ----------------------------<  90  4.2   |  30  |  0.71    Ca    9.2      11 Mar 2021 06:18  Phos  4.6     03-11  Mg     2.1     03-11    TPro  7.0  /  Alb  3.5  /  TBili  0.4  /  DBili  x   /  AST  61<H>  /  ALT  290<H>  /  AlkPhos  218<H>  03-11    LIVER FUNCTIONS - ( 11 Mar 2021 06:18 )  Alb: 3.5 g/dL / Pro: 7.0 g/dL / ALK PHOS: 218 U/L / ALT: 290 U/L DA / AST: 61 U/L / GGT: x

## 2021-03-12 ENCOUNTER — TRANSCRIPTION ENCOUNTER (OUTPATIENT)
Age: 51
End: 2021-03-12

## 2021-03-12 LAB
ALBUMIN SERPL ELPH-MCNC: 3.2 G/DL — LOW (ref 3.5–5)
ALP SERPL-CCNC: 183 U/L — HIGH (ref 40–120)
ALT FLD-CCNC: 278 U/L DA — HIGH (ref 10–60)
AMYLASE P1 CFR SERPL: 43 U/L — SIGNIFICANT CHANGE UP (ref 25–115)
ANION GAP SERPL CALC-SCNC: 7 MMOL/L — SIGNIFICANT CHANGE UP (ref 5–17)
APTT BLD: 28.7 SEC — SIGNIFICANT CHANGE UP (ref 27.5–35.5)
AST SERPL-CCNC: 102 U/L — HIGH (ref 10–40)
BASOPHILS # BLD AUTO: 0.02 K/UL — SIGNIFICANT CHANGE UP (ref 0–0.2)
BASOPHILS NFR BLD AUTO: 0.2 % — SIGNIFICANT CHANGE UP (ref 0–2)
BILIRUB DIRECT SERPL-MCNC: 0.2 MG/DL — SIGNIFICANT CHANGE UP (ref 0–0.2)
BILIRUB SERPL-MCNC: 0.5 MG/DL — SIGNIFICANT CHANGE UP (ref 0.2–1.2)
BUN SERPL-MCNC: 10 MG/DL — SIGNIFICANT CHANGE UP (ref 7–18)
CALCIUM SERPL-MCNC: 8.9 MG/DL — SIGNIFICANT CHANGE UP (ref 8.4–10.5)
CHLORIDE SERPL-SCNC: 107 MMOL/L — SIGNIFICANT CHANGE UP (ref 96–108)
CO2 SERPL-SCNC: 29 MMOL/L — SIGNIFICANT CHANGE UP (ref 22–31)
CREAT SERPL-MCNC: 0.98 MG/DL — SIGNIFICANT CHANGE UP (ref 0.5–1.3)
EOSINOPHIL # BLD AUTO: 0.06 K/UL — SIGNIFICANT CHANGE UP (ref 0–0.5)
EOSINOPHIL NFR BLD AUTO: 0.6 % — SIGNIFICANT CHANGE UP (ref 0–6)
GLUCOSE SERPL-MCNC: 101 MG/DL — HIGH (ref 70–99)
HCT VFR BLD CALC: 37.3 % — SIGNIFICANT CHANGE UP (ref 34.5–45)
HGB BLD-MCNC: 12.3 G/DL — SIGNIFICANT CHANGE UP (ref 11.5–15.5)
IMM GRANULOCYTES NFR BLD AUTO: 0.3 % — SIGNIFICANT CHANGE UP (ref 0–1.5)
INR BLD: 0.96 RATIO — SIGNIFICANT CHANGE UP (ref 0.88–1.16)
LIDOCAIN IGE QN: 188 U/L — SIGNIFICANT CHANGE UP (ref 73–393)
LYMPHOCYTES # BLD AUTO: 2.88 K/UL — SIGNIFICANT CHANGE UP (ref 1–3.3)
LYMPHOCYTES # BLD AUTO: 28.9 % — SIGNIFICANT CHANGE UP (ref 13–44)
MAGNESIUM SERPL-MCNC: 2.2 MG/DL — SIGNIFICANT CHANGE UP (ref 1.6–2.6)
MCHC RBC-ENTMCNC: 30.1 PG — SIGNIFICANT CHANGE UP (ref 27–34)
MCHC RBC-ENTMCNC: 33 GM/DL — SIGNIFICANT CHANGE UP (ref 32–36)
MCV RBC AUTO: 91.2 FL — SIGNIFICANT CHANGE UP (ref 80–100)
MONOCYTES # BLD AUTO: 0.64 K/UL — SIGNIFICANT CHANGE UP (ref 0–0.9)
MONOCYTES NFR BLD AUTO: 6.4 % — SIGNIFICANT CHANGE UP (ref 2–14)
MRSA PCR RESULT.: SIGNIFICANT CHANGE UP
NEUTROPHILS # BLD AUTO: 6.35 K/UL — SIGNIFICANT CHANGE UP (ref 1.8–7.4)
NEUTROPHILS NFR BLD AUTO: 63.6 % — SIGNIFICANT CHANGE UP (ref 43–77)
NRBC # BLD: 0 /100 WBCS — SIGNIFICANT CHANGE UP (ref 0–0)
PHOSPHATE SERPL-MCNC: 3.8 MG/DL — SIGNIFICANT CHANGE UP (ref 2.5–4.5)
PLATELET # BLD AUTO: 257 K/UL — SIGNIFICANT CHANGE UP (ref 150–400)
POTASSIUM SERPL-MCNC: 3.8 MMOL/L — SIGNIFICANT CHANGE UP (ref 3.5–5.3)
POTASSIUM SERPL-SCNC: 3.8 MMOL/L — SIGNIFICANT CHANGE UP (ref 3.5–5.3)
PROT SERPL-MCNC: 6.5 G/DL — SIGNIFICANT CHANGE UP (ref 6–8.3)
PROTHROM AB SERPL-ACNC: 11.5 SEC — SIGNIFICANT CHANGE UP (ref 10.6–13.6)
RBC # BLD: 4.09 M/UL — SIGNIFICANT CHANGE UP (ref 3.8–5.2)
RBC # FLD: 12.6 % — SIGNIFICANT CHANGE UP (ref 10.3–14.5)
S AUREUS DNA NOSE QL NAA+PROBE: SIGNIFICANT CHANGE UP
SODIUM SERPL-SCNC: 143 MMOL/L — SIGNIFICANT CHANGE UP (ref 135–145)
WBC # BLD: 9.98 K/UL — SIGNIFICANT CHANGE UP (ref 3.8–10.5)
WBC # FLD AUTO: 9.98 K/UL — SIGNIFICANT CHANGE UP (ref 3.8–10.5)

## 2021-03-12 PROCEDURE — 43262 ENDO CHOLANGIOPANCREATOGRAPH: CPT

## 2021-03-12 PROCEDURE — 99232 SBSQ HOSP IP/OBS MODERATE 35: CPT | Mod: GC

## 2021-03-12 RX ORDER — PANTOPRAZOLE SODIUM 20 MG/1
1 TABLET, DELAYED RELEASE ORAL
Qty: 14 | Refills: 0
Start: 2021-03-12 | End: 2021-03-25

## 2021-03-12 RX ORDER — SENNA PLUS 8.6 MG/1
2 TABLET ORAL AT BEDTIME
Refills: 0 | Status: DISCONTINUED | OUTPATIENT
Start: 2021-03-12 | End: 2021-03-13

## 2021-03-12 RX ORDER — ENOXAPARIN SODIUM 100 MG/ML
40 INJECTION SUBCUTANEOUS DAILY
Refills: 0 | Status: DISCONTINUED | OUTPATIENT
Start: 2021-03-12 | End: 2021-03-13

## 2021-03-12 RX ORDER — POLYETHYLENE GLYCOL 3350 17 G/17G
17 POWDER, FOR SOLUTION ORAL
Refills: 0 | Status: DISCONTINUED | OUTPATIENT
Start: 2021-03-12 | End: 2021-03-13

## 2021-03-12 RX ADMIN — ENOXAPARIN SODIUM 40 MILLIGRAM(S): 100 INJECTION SUBCUTANEOUS at 17:32

## 2021-03-12 RX ADMIN — Medication 100 GRAM(S): at 17:32

## 2021-03-12 RX ADMIN — Medication 100 GRAM(S): at 05:47

## 2021-03-12 RX ADMIN — Medication 50 MILLIGRAM(S): at 07:00

## 2021-03-12 RX ADMIN — PANTOPRAZOLE SODIUM 40 MILLIGRAM(S): 20 TABLET, DELAYED RELEASE ORAL at 17:32

## 2021-03-12 RX ADMIN — SODIUM CHLORIDE 75 MILLILITER(S): 9 INJECTION, SOLUTION INTRAVENOUS at 21:49

## 2021-03-12 RX ADMIN — Medication 50 MILLIGRAM(S): at 06:03

## 2021-03-12 RX ADMIN — OXYCODONE AND ACETAMINOPHEN 1 TABLET(S): 5; 325 TABLET ORAL at 13:33

## 2021-03-12 RX ADMIN — OXYCODONE AND ACETAMINOPHEN 1 TABLET(S): 5; 325 TABLET ORAL at 12:51

## 2021-03-12 RX ADMIN — SODIUM CHLORIDE 75 MILLILITER(S): 9 INJECTION, SOLUTION INTRAVENOUS at 17:33

## 2021-03-12 RX ADMIN — SENNA PLUS 2 TABLET(S): 8.6 TABLET ORAL at 21:50

## 2021-03-12 NOTE — DISCHARGE NOTE PROVIDER - CARE PROVIDER_API CALL
Evan Rodriguez)  Surgery  [address] [place] [address]  Phone: [phone]  Fax: [phone]  Scheduled Appointment: 03/17/2021    Roosevelt Jo)  Gastroenterology  [address] [place] [address]  Phone: [phone]  Fax: [phone]  Follow Up Time:

## 2021-03-12 NOTE — DISCHARGE NOTE PROVIDER - NSDCMRMEDTOKEN_GEN_ALL_CORE_FT
pantoprazole 40 mg oral delayed release tablet: 1 tab(s) orally once a day (before a meal)    pantoprazole 40 mg oral delayed release tablet: 1 tab(s) orally once a day (before a meal)   senna oral tablet: 1 tab(s) orally once a day (at bedtime)   Tylenol 325 mg oral tablet: 2 tab(s) orally every 6 hours, As Needed

## 2021-03-12 NOTE — PROGRESS NOTE ADULT - NSHPATTENDINGPLANDISCUSS_GEN_ALL_CORE
Dr. Alexander, Surgery
Medical Resident, Dr. Alexander

## 2021-03-12 NOTE — PROGRESS NOTE ADULT - SUBJECTIVE AND OBJECTIVE BOX
Post Operative Day #: 1    SUBJECTIVE: 51y Female s/p laparoscopic cholecystectomy with intraoperative cholangiogram 3/11    INTERVAL HPI/OVERNIGHT EVENTS:    Pt states feels well  Tolerated clears last night  Currently NPO for ERCP today    MEDICATIONS  (STANDING):  cefoTEtan  IVPB 2 Gram(s) IV Intermittent every 12 hours  dextrose 5% + sodium chloride 0.9%. 1000 milliLiter(s) (75 mL/Hr) IV Continuous <Continuous>  influenza   Vaccine 0.5 milliLiter(s) IntraMuscular once  pantoprazole  Injectable 40 milliGRAM(s) IV Push daily  senna 2 Tablet(s) Oral at bedtime    MEDICATIONS  (PRN):  morphine  - Injectable 4 milliGRAM(s) IV Push every 6 hours PRN Severe Pain (7 - 10)  ondansetron Injectable 4 milliGRAM(s) IV Push every 6 hours PRN Nausea and/or Vomiting  oxycodone    5 mG/acetaminophen 325 mG 1 Tablet(s) Oral every 4 hours PRN Mild Pain (1 - 3)  polyethylene glycol 3350 17 Gram(s) Oral two times a day PRN Constipation      OBJECTIVE:  Vital Signs Last 24 Hrs  T(C): 36.6 (12 Mar 2021 05:42), Max: 36.6 (11 Mar 2021 11:03)  T(F): 97.8 (12 Mar 2021 05:42), Max: 97.9 (11 Mar 2021 11:03)  HR: 69 (12 Mar 2021 05:42) (52 - 87)  BP: 113/65 (12 Mar 2021 05:42) (100/67 - 115/62)  BP(mean): 69 (11 Mar 2021 11:03) (69 - 86)  RR: 17 (12 Mar 2021 05:42) (12 - 18)  SpO2: 97% (12 Mar 2021 05:42) (95% - 100%)    Physical Exam:    Gen: awake, alert oriented NAD  HEENT: anicteric  Abdomen: surgical wounds dressed c/d/i, soft, incisional tenderness, not distended, PHILIP with dark fluid   Extremities: warm to touch, no c/c/e            I&O's Detail    11 Mar 2021 07:01  -  12 Mar 2021 07:00  --------------------------------------------------------  IN:    Lactated Ringers Bolus: 1400 mL  Total IN: 1400 mL    OUT:    Bulb (mL): 0 mL  Total OUT: 0 mL    Total NET: 1400 mL          Labs:                          12.3   9.98  )-----------( 257      ( 12 Mar 2021 06:30 )             37.3     03-12    143  |  107  |  10  ----------------------------<  101<H>  3.8   |  29  |  0.98    Ca    8.9      12 Mar 2021 06:30  Phos  3.8     03-12  Mg     2.2     03-12    TPro  6.5  /  Alb  3.2<L>  /  TBili  0.5  /  DBili  0.2  /  AST  102<H>  /  ALT  278<H>  /  AlkPhos  183<H>  03-12    PT/INR - ( 12 Mar 2021 06:30 )   PT: 11.5 sec;   INR: 0.96 ratio         PTT - ( 12 Mar 2021 06:30 )  PTT:28.7 sec

## 2021-03-12 NOTE — PROGRESS NOTE ADULT - ATTENDING COMMENTS
Patient was examined and discussed with Dr. Alexander and with Surgery.     She presented with c/o intermittent epigastric pain x 1 week.   vs, as above  Lungs, clear  Cor, RRR  Abdomen, soft                        12.6   5.22  )-----------( 248      ( 08 Mar 2021 06:45 )             39.7   03-07    139  |  105  |  15  ----------------------------<  109<H>  3.9   |  30  |  0.70    Ca    9.6      07 Mar 2021 18:36  Phos  4.3     03-08  Mg     2.2     03-08    TPro  6.8  /  Alb  3.5  /  TBili  0.8  /  DBili  0.3<H>  /  AST  288<H>  /  ALT  641<H>  /  AlkPhos  331<H>  03-08    Lipase, Serum (03.08.21 @ 06:45)   Lipase, Serum: 540 U/L   Lipase, Serum (03.07.21 @ 18:36)   Lipase, Serum: 7299 U/L   r< from: MR MRCP No Cont (03.08.21 @ 18:02) >    IMPRESSION:  1. Cholelithiasis.  2. Increased T2 signal surrounding the gallbladder with apparent gallbladder  wall thickeningsuggestive of acute cholecystitis. Correlate clinically.    < end of copied text >    < from: CT Abdomen and Pelvis w/ IV Cont (03.07.21 @ 20:55) >    Suggestion of gallstones as better delineated on recent right upper quadrant ultrasound.  Hyperemic thickened gallbladder wall with trace surrounding stranding. Consider correlation with HIDA scan to exclude acute cholecystitis.    Small hiatal hernia or wall thickening of the distal thoracic esophagus. Consider nonemergent upper endoscopy evaluation if indicated.    No acute bowel inflammatory changes or obstruction.    Mild bladder wall thickening, difficult to assess secondary to inadequate distention. Correlate with urinalysis and laboratory values to assess for cystitis in appropriate clinical setting.    < end of copied text >    IMP:  Cholecystitis with rapidly resolving pancreatitis, likely related to gallstones.  Rapidly improving clinically  Surgical consultation, seen and appreciated.   Patient is optimized to have cholecystectomy, when OR schedule allows.
Patient seen and examined this morning after returning from ERCP, plan of care discussed w/ medical team. Patient admitted for gallstone pancreatitis, symptoms resolved, no fever or leukocytosis therefore no Abx were given, GI Dr Jo and surgery Dr. Rodriguez following, underwent lap milton yesterday, had debris in distal CBD w/ no flow into duodenum, has a PHILIP drain in place. Dr. Jo took pt for ERCP today, had a sphinterotomy and free flow of bile afterwards, recommeding PPI x 2 weeks and repeat labs in AM. Place on liquid diet for now and monitor LFTs. Follow up GI and Surgery in AM, possible discharge tomorrow if stable.    Rest as per resident's note.
Patient seen and examined this morning, plan of care discussed w/ medical team. Patient admitted for gallstone pancreatitis, symptoms resolved, no fever or leukocytosis therefore hold Abx, GI and surgery following, plan for lap milton tomorrow (Thursday 3/11). Continue clear liquid diet today, NPO at midnight, hold dvt ppx, monitor LFTs. If having fever/leukocytosis/reocurrance of symptoms can start on Cipro/Flagyl. Patient medically optimized to proceed to the OR as per surgery, no further pre-operative testing indicated.    Rest as per resident's note.
Patient seen and examined this morning after returning from lap milton, plan of care discussed w/ medical team. Patient admitted for gallstone pancreatitis, symptoms resolved, no fever or leukocytosis therefore no Abx were given, GI Dr Jo and surgery Dr. Rodriguez following, underwent lap milton today, had debris in distal CBD w/ no flow into duodenum, has a PHILIP drain in place. Dr. Jo planning on ERCP tomorrow to evaluate distal CBD, will keep NPO at midnight, and continue to hold dvt ppx, monitor LFTs. If having fever/leukocytosis/reocurrance of symptoms can start on Cipro/Flagyl.     Rest as per resident's note.
Patient seen and examined this morning, plan of care discussed w/ medical team. Patient being treated for gallstone pancreatitis, symptoms resolved, no fever or leukocytosis therefore hold Abx, GI and surgery following, plan for lap milton on Thursday 3/11. Start on clear liquid diet today, and monitor LFTs. If having fever/leukocytosis/reocurrance of symptoms can start on Cipro/Flagyl.    Rest as per resident's note.

## 2021-03-12 NOTE — DISCHARGE NOTE PROVIDER - NSDCCPCAREPLAN_GEN_ALL_CORE_FT
PRINCIPAL DISCHARGE DIAGNOSIS  Diagnosis: Acute pancreatitis, unspecified complication status, unspecified pancreatitis type  Assessment and Plan of Treatment: You presented with abdominal pain  - CT scan showed Gallbladder stone and you also met the criteria for acute pancreatitis  - you gallbladder was removed during this admission  - also endoscopic retrograde cholangiopancreatography was performed to determine and resolve common bile duct obstruction. 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  - you will be discharged with Pantoprazole 40mg once in the morning before meal for 14 days.      SECONDARY DISCHARGE DIAGNOSES  Diagnosis: Transaminitis  Assessment and Plan of Treatment: Transaminitis 2/2 cholelithiasis     PRINCIPAL DISCHARGE DIAGNOSIS  Diagnosis: Acute pancreatitis, unspecified complication status, unspecified pancreatitis type  Assessment and Plan of Treatment: You presented with abdominal pain  - CT scan showed Gallbladder stone and you also met the criteria for acute pancreatitis  - you gallbladder was removed during this admission  - also endoscopic retrograde cholangiopancreatography was performed to determine and resolve common bile duct obstruction. 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  - you will be discharged with Pantoprazole 40mg once in the morning before meal for 14 days.  - As per surgery recommendations., we are discharging you with PHILIP drain and you can follow up with Dr Rodriguez on 3/17. You will need to make appointment for that.  - Please take low fat diet anf follow up with your primary care in 1-2 weeks to inform them of recent admission      SECONDARY DISCHARGE DIAGNOSES  Diagnosis: Transaminitis  Assessment and Plan of Treatment: Your liver enzymes were elevated likely due to cholelithiasis. it is coming down.    We would recommend to get follow up with your PCP in 1-2 weeks for continuity of care and gettign repeat blood work.

## 2021-03-12 NOTE — PROGRESS NOTE ADULT - ASSESSMENT
Patient is a 52 yo female with no PMH presented with intermitted epigastric abdominal pain for 1 week. Patient is admitted for abdominal Pain possibly due to Gallstone Pancreatitis

## 2021-03-12 NOTE — PROGRESS NOTE ADULT - SUBJECTIVE AND OBJECTIVE BOX
PGY-1 Progress Note discussed with attending    PAGER #: [[phone]] TILL 5:00 PM  PLEASE CONTACT ON CALL TEAM:  - On Call Team (Please refer to Jatin) FROM 5:00 PM - 8:30PM  - Nightfloat Team FROM 8:30 -7:30 AM    INTERVAL HPI  - CT abd showed gallstone without any peripancreatic changes. Lipase noted to be 7299. Lipase and LFTs trending down. Patient remains asymptomatic and tolerating clear liquid diet. Patient is planned for elective cholecystectomy on 3/11. Patient tolerated cholecystectomy well, but Intraoperative cholangiogram showed distal CBD blockage. FAY drainage was placed, and ERCP was performed today (3/12) and had no complications and the procedure was tolerated well by the patient. Patient was noted to have papillary stenosis intraoperatively that was alleviated by the sphincterotomy during ERCP and resolved the obstruction of the common bile duct.     OVERNIGHT EVENTS   - Patient's vitals remained stable overnight and she remained afebrile. Patient reports of mild diffuse abdominal pain.  Denies any recurrent episodes of emesis. She denies any fever nausea, chills, shortness of breath, chest pain and bowel movements.  - patient tolerated ERCP well this am.    REVIEW OF SYSTEMS:  CONSTITUTIONAL: No fever, weight loss, or fatigue  RESPIRATORY: No cough, wheezing, chills or hemoptysis; No shortness of breath  CARDIOVASCULAR: No chest pain, palpitations, dizziness, or leg swelling  GASTROINTESTINAL: Reports diffuse abdominal pain across the RUQ and LUQ after the procedure that she described as an "8" and  improved to a "1"with pain medication. . No nausea, vomiting, or hematemesis; No diarrhea or constipation. No melena or hematochezia.  GENITOURINARY: No dysuria or hematuria, urinary frequency  NEUROLOGICAL: No headaches, memory loss, loss of strength, numbness, or tremors  SKIN: No itching, burning, rashes, or lesions     MEDICATIONS  (STANDING):  cefoTEtan  IVPB 2 Gram(s) IV Intermittent every 12 hours  dextrose 5% + sodium chloride 0.9%. 1000 milliLiter(s) (75 mL/Hr) IV Continuous <Continuous>  influenza   Vaccine 0.5 milliLiter(s) IntraMuscular once  pantoprazole  Injectable 40 milliGRAM(s) IV Push daily  senna 2 Tablet(s) Oral at bedtime    MEDICATIONS  (PRN):  morphine  - Injectable 4 milliGRAM(s) IV Push every 6 hours PRN Severe Pain (7 - 10)  ondansetron Injectable 4 milliGRAM(s) IV Push every 6 hours PRN Nausea and/or Vomiting  oxycodone    5 mG/acetaminophen 325 mG 1 Tablet(s) Oral every 4 hours PRN Mild Pain (1 - 3)  polyethylene glycol 3350 17 Gram(s) Oral two times a day PRN Constipation      Vital Signs Last 24 Hrs  T(C): 36.6 (12 Mar 2021 05:42), Max: 36.6 (12 Mar 2021 05:42)  T(F): 97.8 (12 Mar 2021 05:42), Max: 97.8 (12 Mar 2021 05:42)  HR: 69 (12 Mar 2021 05:42) (65 - 87)  BP: 113/65 (12 Mar 2021 05:42) (110/60 - 113/68)  BP(mean): --  RR: 17 (12 Mar 2021 05:42) (16 - 18)  SpO2: 97% (12 Mar 2021 05:42) (96% - 98%)    PHYSICAL EXAMINATION:  GENERAL: NAD, AAOx3  HEAD: AT/NC  EYES: conjunctiva and sclera clear  NECK: supple, No JVD noted, Normal thyroid  CHEST/LUNG: CTABL; no rales, rhonchi, wheezing, or rubs  HEART: regular rate and rhythm; no murmurs, rubs, or gallops  ABDOMEN: RUQ tenderness noted, but soft and nondistended; Bowel sounds present  EXTREMITIES:  2+ Peripheral Pulses, No clubbing, cyanosis, or edema  SKIN: warm dry, 25 ml of sanguinous drainage noted in the PHILIP drain, and dressing in place                          12.3   9.98  )-----------( 257      ( 12 Mar 2021 06:30 )             37.3     03-12    143  |  107  |  10  ----------------------------<  101<H>  3.8   |  29  |  0.98    Ca    8.9      12 Mar 2021 06:30  Phos  3.8     03-12  Mg     2.2     03-12    TPro  6.5  /  Alb  3.2<L>  /  TBili  0.5  /  DBili  0.2  /  AST  102<H>  /  ALT  278<H>  /  AlkPhos  183<H>  03-12    LIVER FUNCTIONS - ( 12 Mar 2021 06:30 )  Alb: 3.2 g/dL / Pro: 6.5 g/dL / ALK PHOS: 183 U/L / ALT: 278 U/L DA / AST: 102 U/L / GGT: x               PT/INR - ( 12 Mar 2021 06:30 )   PT: 11.5 sec;   INR: 0.96 ratio         PTT - ( 12 Mar 2021 06:30 )  PTT:28.7 sec  COVID-19 PCR: NotDetec (10 Mar 2021 09:33)  COVID-19 PCR: NotDetec (07 Mar 2021 21:18)      CAPILLARY BLOOD GLUCOSE          RADIOLOGY & ADDITIONAL TESTS:

## 2021-03-12 NOTE — DISCHARGE NOTE PROVIDER - HOSPITAL COURSE
Patient is a 50 yo female with no PMH presented with intermitted epigastric abdominal pain for 1 week. Patient states pain is radiating to the back and is associated with nausea. Patient has no previous hx of similar pain. Patient denies taking any medications, drugs, or alcohol. Patient denies CP, sob, diarrhea, constipation, headache, flu like symptoms. Admitted for Gallstone pancreatitis. CT abd showed gallstone without any peripancreatic changes. Lipase noted to be 7299. Lipase and LFTs trending down. Patient remains asymptomatic and tolerating clear liquid diet. Non-urgent cholecystectomy performed on 3/11. Patient tolerated cholecystectomy well, but Intraoperative cholangiogram showed distal CBD blockage. FAY drainage was placed, and ERCP with sphincterotomy for papillary stenosis was performed on 3/12. You will be discharged with Pantoprazole once a day for 14 days. Patient is a 52 yo female with no PMH presented with intermitted epigastric abdominal pain for 1 week. Patient states pain is radiating to the back and is associated with nausea. Patient has no previous hx of similar pain. Patient denies taking any medications, drugs, or alcohol. Patient denies CP, sob, diarrhea, constipation, headache, flu like symptoms. Admitted for Gallstone pancreatitis. CT abd showed gallstone without any peripancreatic changes. Lipase noted to be 7299. Lipase and LFTs trending down. Patient remains asymptomatic and tolerating clear liquid diet. Non-urgent cholecystectomy performed on 3/11. Patient tolerated cholecystectomy well, but Intraoperative cholangiogram showed distal CBD blockage. FAY drainage was placed, and ERCP with sphincterotomy for papillary stenosis was performed on 3/12. Patient was discharged with Pantoprazole once a day for 14 days. she will follow up with surgery for PHILIP drain removal .      Given patient's improved clinical status and current hemodynamic stability, decision was made to discharge. Discussed with attending  Please refer to patient's complete medical chart with documents for a full hospital course, for this is only a brief summary. Patient is a 50 yo female with no PMH presented with intermitted epigastric abdominal pain for 1 week. Patient states pain is radiating to the back and is associated with nausea. Patient has no previous hx of similar pain. Patient denies taking any medications, drugs, or alcohol. Patient denies CP, sob, diarrhea, constipation, headache, flu like symptoms. Admitted for Gallstone pancreatitis. CT abd showed gallstone without any peripancreatic changes. Lipase noted to be 7299. Lipase and LFTs trending down. Patient remains asymptomatic and tolerating clear liquid diet. Non-urgent laparoscopic cholecystectomy performed on 3/11 by Dr. Rodriguez. Patient tolerated cholecystectomy well, but Intraoperative cholangiogram showed distal CBD blockage. FAY drainage was placed, and ERCP with sphincterotomy for papillary stenosis was performed on 3/12, with free flow of bile after sphincterotomy. Patient was discharged with Pantoprazole once a day for 14 days. she will follow up with surgery for PHILIP drain removal, Dr. Rodriguez on 3/17.      Given patient's improved clinical status and current hemodynamic stability, decision was made to discharge. Discussed with attending  Please refer to patient's complete medical chart with documents for a full hospital course, for this is only a brief summary.

## 2021-03-12 NOTE — PROGRESS NOTE ADULT - PROBLEM SELECTOR PLAN 1
- p/w epigastic pain   - callie 2/2 Gallstone Pancreatitis  - BAL <3  - lipase 7,299 >540  - U/S abd cholelithiasis  - CT A/P gallstones, but pancreas within normal limits  - BISAP Score= 0  - MRCP small gallstones w/ wall thickening/edema and/or trace pericholecystic fluid  - s/p cholecystectomy  - s/p ERCP sphincterotomy   - c/w cefotetan pain management, and oral intake    - GI, Dr. Jo, onboard  - surgery onboard

## 2021-03-12 NOTE — PROGRESS NOTE ADULT - SUBJECTIVE AND OBJECTIVE BOX
ENDOSCOPIC RETROGRADE CHOLANGIO-PANCREATOGRAPHY (ERCP    -Informed consent obtained from patient prior to exam.     INDICATION:  Abnormal IOC    Anesthesia provided by: General    ESOPHAGUS: Normal, small HH      STOMACH: Normal    DUODENUM: Normal    PANCREATIC DUCT: Not injected    BILE DUCT: Normal. patient had papillary stenosis with difficult cannulation and scant bile seen. Duct swept x 2 without debris. Free flow of bile after sphinterotomy. Intrahepatics also normal.     Cystic DUCT remnant normal    The patient tolerated the procedure well.    Assessment: Papillary stenosis     PLAN:   Full liquids  Labs AM  PPI x 2 weeks    Roosevelt Jo MD ENDOSCOPIC RETROGRADE CHOLANGIO-PANCREATOGRAPHY (ERCP)           start   09:48            end    10:30               scope #[id number]                -Informed consent obtained from patient prior to exam.     INDICATION:  Abnormal IOC    Anesthesia provided by: General    ESOPHAGUS: Normal, small HH      STOMACH: Normal    DUODENUM: Normal    PANCREATIC DUCT: Not injected    BILE DUCT: Normal. patient had papillary stenosis with difficult cannulation and scant bile seen. Duct swept x 2 without debris. Free flow of bile after sphinterotomy. Intrahepatics also normal.     Cystic DUCT remnant normal    The patient tolerated the procedure well.    Assessment: Papillary stenosis     PLAN:   Full liquids  Labs AM  PPI x 2 weeks    Roosevelt Jo MD

## 2021-03-12 NOTE — PROGRESS NOTE ADULT - ASSESSMENT
s/p laparoscopic cholecystectomy with intraoperative cholangiogram showing distal CBD stones  1. NPO for ERCP today  2. Advance diet after  3. Incentive spirometry  4. DVT prophylaxis  5. D/c planning

## 2021-03-13 ENCOUNTER — TRANSCRIPTION ENCOUNTER (OUTPATIENT)
Age: 51
End: 2021-03-13

## 2021-03-13 VITALS
OXYGEN SATURATION: 95 % | RESPIRATION RATE: 16 BRPM | TEMPERATURE: 98 F | SYSTOLIC BLOOD PRESSURE: 116 MMHG | DIASTOLIC BLOOD PRESSURE: 74 MMHG | HEART RATE: 69 BPM

## 2021-03-13 LAB
ALBUMIN SERPL ELPH-MCNC: 3 G/DL — LOW (ref 3.5–5)
ALP SERPL-CCNC: 156 U/L — HIGH (ref 40–120)
ALT FLD-CCNC: 204 U/L DA — HIGH (ref 10–60)
ANION GAP SERPL CALC-SCNC: 8 MMOL/L — SIGNIFICANT CHANGE UP (ref 5–17)
AST SERPL-CCNC: 52 U/L — HIGH (ref 10–40)
BASOPHILS # BLD AUTO: 0.02 K/UL — SIGNIFICANT CHANGE UP (ref 0–0.2)
BASOPHILS NFR BLD AUTO: 0.2 % — SIGNIFICANT CHANGE UP (ref 0–2)
BILIRUB SERPL-MCNC: 0.2 MG/DL — SIGNIFICANT CHANGE UP (ref 0.2–1.2)
BUN SERPL-MCNC: 7 MG/DL — SIGNIFICANT CHANGE UP (ref 7–18)
CALCIUM SERPL-MCNC: 8.4 MG/DL — SIGNIFICANT CHANGE UP (ref 8.4–10.5)
CHLORIDE SERPL-SCNC: 109 MMOL/L — HIGH (ref 96–108)
CO2 SERPL-SCNC: 28 MMOL/L — SIGNIFICANT CHANGE UP (ref 22–31)
CREAT SERPL-MCNC: 1.47 MG/DL — HIGH (ref 0.5–1.3)
EOSINOPHIL # BLD AUTO: 0.04 K/UL — SIGNIFICANT CHANGE UP (ref 0–0.5)
EOSINOPHIL NFR BLD AUTO: 0.5 % — SIGNIFICANT CHANGE UP (ref 0–6)
GLUCOSE SERPL-MCNC: 89 MG/DL — SIGNIFICANT CHANGE UP (ref 70–99)
HCT VFR BLD CALC: 36 % — SIGNIFICANT CHANGE UP (ref 34.5–45)
HGB BLD-MCNC: 11.8 G/DL — SIGNIFICANT CHANGE UP (ref 11.5–15.5)
IMM GRANULOCYTES NFR BLD AUTO: 0.5 % — SIGNIFICANT CHANGE UP (ref 0–1.5)
LYMPHOCYTES # BLD AUTO: 3.28 K/UL — SIGNIFICANT CHANGE UP (ref 1–3.3)
LYMPHOCYTES # BLD AUTO: 39.7 % — SIGNIFICANT CHANGE UP (ref 13–44)
MAGNESIUM SERPL-MCNC: 1.9 MG/DL — SIGNIFICANT CHANGE UP (ref 1.6–2.6)
MCHC RBC-ENTMCNC: 30.3 PG — SIGNIFICANT CHANGE UP (ref 27–34)
MCHC RBC-ENTMCNC: 32.8 GM/DL — SIGNIFICANT CHANGE UP (ref 32–36)
MCV RBC AUTO: 92.5 FL — SIGNIFICANT CHANGE UP (ref 80–100)
MONOCYTES # BLD AUTO: 0.52 K/UL — SIGNIFICANT CHANGE UP (ref 0–0.9)
MONOCYTES NFR BLD AUTO: 6.3 % — SIGNIFICANT CHANGE UP (ref 2–14)
NEUTROPHILS # BLD AUTO: 4.36 K/UL — SIGNIFICANT CHANGE UP (ref 1.8–7.4)
NEUTROPHILS NFR BLD AUTO: 52.8 % — SIGNIFICANT CHANGE UP (ref 43–77)
NRBC # BLD: 0 /100 WBCS — SIGNIFICANT CHANGE UP (ref 0–0)
PHOSPHATE SERPL-MCNC: 2.3 MG/DL — LOW (ref 2.5–4.5)
PLATELET # BLD AUTO: 236 K/UL — SIGNIFICANT CHANGE UP (ref 150–400)
POTASSIUM SERPL-MCNC: 3.5 MMOL/L — SIGNIFICANT CHANGE UP (ref 3.5–5.3)
POTASSIUM SERPL-SCNC: 3.5 MMOL/L — SIGNIFICANT CHANGE UP (ref 3.5–5.3)
PROT SERPL-MCNC: 6.1 G/DL — SIGNIFICANT CHANGE UP (ref 6–8.3)
RBC # BLD: 3.89 M/UL — SIGNIFICANT CHANGE UP (ref 3.8–5.2)
RBC # FLD: 12.7 % — SIGNIFICANT CHANGE UP (ref 10.3–14.5)
SODIUM SERPL-SCNC: 145 MMOL/L — SIGNIFICANT CHANGE UP (ref 135–145)
WBC # BLD: 8.26 K/UL — SIGNIFICANT CHANGE UP (ref 3.8–10.5)
WBC # FLD AUTO: 8.26 K/UL — SIGNIFICANT CHANGE UP (ref 3.8–10.5)

## 2021-03-13 PROCEDURE — 96374 THER/PROPH/DIAG INJ IV PUSH: CPT

## 2021-03-13 PROCEDURE — 83036 HEMOGLOBIN GLYCOSYLATED A1C: CPT

## 2021-03-13 PROCEDURE — 74181 MRI ABDOMEN W/O CONTRAST: CPT

## 2021-03-13 PROCEDURE — 80076 HEPATIC FUNCTION PANEL: CPT

## 2021-03-13 PROCEDURE — 80053 COMPREHEN METABOLIC PANEL: CPT

## 2021-03-13 PROCEDURE — 84443 ASSAY THYROID STIM HORMONE: CPT

## 2021-03-13 PROCEDURE — 81025 URINE PREGNANCY TEST: CPT

## 2021-03-13 PROCEDURE — 86900 BLOOD TYPING SEROLOGIC ABO: CPT

## 2021-03-13 PROCEDURE — 76705 ECHO EXAM OF ABDOMEN: CPT

## 2021-03-13 PROCEDURE — 99285 EMERGENCY DEPT VISIT HI MDM: CPT

## 2021-03-13 PROCEDURE — 83735 ASSAY OF MAGNESIUM: CPT

## 2021-03-13 PROCEDURE — 83615 LACTATE (LD) (LDH) ENZYME: CPT

## 2021-03-13 PROCEDURE — 81001 URINALYSIS AUTO W/SCOPE: CPT

## 2021-03-13 PROCEDURE — 84100 ASSAY OF PHOSPHORUS: CPT

## 2021-03-13 PROCEDURE — 84478 ASSAY OF TRIGLYCERIDES: CPT

## 2021-03-13 PROCEDURE — 85610 PROTHROMBIN TIME: CPT

## 2021-03-13 PROCEDURE — 99239 HOSP IP/OBS DSCHRG MGMT >30: CPT | Mod: GC

## 2021-03-13 PROCEDURE — 76000 FLUOROSCOPY <1 HR PHYS/QHP: CPT

## 2021-03-13 PROCEDURE — 80307 DRUG TEST PRSMV CHEM ANLYZR: CPT

## 2021-03-13 PROCEDURE — 82150 ASSAY OF AMYLASE: CPT

## 2021-03-13 PROCEDURE — 82746 ASSAY OF FOLIC ACID SERUM: CPT

## 2021-03-13 PROCEDURE — 85652 RBC SED RATE AUTOMATED: CPT

## 2021-03-13 PROCEDURE — 88304 TISSUE EXAM BY PATHOLOGIST: CPT

## 2021-03-13 PROCEDURE — 82248 BILIRUBIN DIRECT: CPT

## 2021-03-13 PROCEDURE — 80061 LIPID PANEL: CPT

## 2021-03-13 PROCEDURE — 86901 BLOOD TYPING SEROLOGIC RH(D): CPT

## 2021-03-13 PROCEDURE — 83690 ASSAY OF LIPASE: CPT

## 2021-03-13 PROCEDURE — 85025 COMPLETE CBC W/AUTO DIFF WBC: CPT

## 2021-03-13 PROCEDURE — 85730 THROMBOPLASTIN TIME PARTIAL: CPT

## 2021-03-13 PROCEDURE — 86769 SARS-COV-2 COVID-19 ANTIBODY: CPT

## 2021-03-13 PROCEDURE — 82247 BILIRUBIN TOTAL: CPT

## 2021-03-13 PROCEDURE — 84145 PROCALCITONIN (PCT): CPT

## 2021-03-13 PROCEDURE — 82607 VITAMIN B-12: CPT

## 2021-03-13 PROCEDURE — 82977 ASSAY OF GGT: CPT

## 2021-03-13 PROCEDURE — C1889: CPT

## 2021-03-13 PROCEDURE — 74177 CT ABD & PELVIS W/CONTRAST: CPT

## 2021-03-13 PROCEDURE — 87640 STAPH A DNA AMP PROBE: CPT

## 2021-03-13 PROCEDURE — 85027 COMPLETE CBC AUTOMATED: CPT

## 2021-03-13 PROCEDURE — 86850 RBC ANTIBODY SCREEN: CPT

## 2021-03-13 PROCEDURE — 36415 COLL VENOUS BLD VENIPUNCTURE: CPT

## 2021-03-13 PROCEDURE — C1769: CPT

## 2021-03-13 PROCEDURE — U0005: CPT

## 2021-03-13 PROCEDURE — 87635 SARS-COV-2 COVID-19 AMP PRB: CPT

## 2021-03-13 PROCEDURE — 82728 ASSAY OF FERRITIN: CPT

## 2021-03-13 PROCEDURE — 80074 ACUTE HEPATITIS PANEL: CPT

## 2021-03-13 PROCEDURE — 87641 MR-STAPH DNA AMP PROBE: CPT

## 2021-03-13 PROCEDURE — 82306 VITAMIN D 25 HYDROXY: CPT

## 2021-03-13 RX ORDER — PANTOPRAZOLE SODIUM 20 MG/1
1 TABLET, DELAYED RELEASE ORAL
Qty: 14 | Refills: 0
Start: 2021-03-13 | End: 2021-03-26

## 2021-03-13 RX ORDER — SENNA PLUS 8.6 MG/1
1 TABLET ORAL
Qty: 14 | Refills: 0
Start: 2021-03-13 | End: 2021-03-26

## 2021-03-13 RX ORDER — ACETAMINOPHEN 500 MG
2 TABLET ORAL
Qty: 56 | Refills: 0
Start: 2021-03-13 | End: 2021-03-19

## 2021-03-13 RX ADMIN — PANTOPRAZOLE SODIUM 40 MILLIGRAM(S): 20 TABLET, DELAYED RELEASE ORAL at 12:39

## 2021-03-13 RX ADMIN — Medication 100 GRAM(S): at 06:09

## 2021-03-13 RX ADMIN — ENOXAPARIN SODIUM 40 MILLIGRAM(S): 100 INJECTION SUBCUTANEOUS at 12:38

## 2021-03-13 NOTE — PROGRESS NOTE ADULT - PROBLEM SELECTOR PLAN 1
- p/w epigastic pain   - callie 2/2 Gallstone Pancreatitis  - BAL <3  - lipase 7,299 >540  - U/S abd cholelithiasis  - CT A/P gallstones, but pancreas within normal limits  - BISAP Score= 0  - MRCP small gallstones w/ wall thickening/edema and/or trace pericholecystic fluid  - GI, Dr. Jo, onboard  - surgery onboard  - s/p lap cholecystectomy 3/11, w/ PHILIP drain placement  - s/p ERCP sphincterotomy 3/12  - on perioperative abx w/ cefotetan   - Surgery recommended to keep PHILIP in place on discharge and to follow up with Dr. Rodriguez as outpatient on 3/17 for drain removal

## 2021-03-13 NOTE — PROGRESS NOTE ADULT - ASSESSMENT
51 yoF s/p lap milton 3/11, pod#2    afeb, vss; PHILIP output 65cc  Cr 1.4, uop not recorded    - reg diet as tolerated  - ivf support if needed  - pt ok for discharge home with PHILIP in place  - pt to f/u with Dr. Rodriguez on 3/17 as outpt for drain removal, needs to schedule an appt  - d/w attending

## 2021-03-13 NOTE — PROGRESS NOTE ADULT - REASON FOR ADMISSION
abdominal pain,

## 2021-03-13 NOTE — PROGRESS NOTE ADULT - ASSESSMENT
Patient is a 50 yo female with no PMH presented with intermitted epigastric abdominal pain for 1 week and admitted for abdominal due to Gallstone Pancreatitis.

## 2021-03-13 NOTE — PROGRESS NOTE ADULT - SUBJECTIVE AND OBJECTIVE BOX
Patient seen and examined this morning, states she's feeling much better, abdominal pain is minimal. Denies n/v, cough, sob.    cefoTEtan  IVPB 2 Gram(s) IV Intermittent every 12 hours  dextrose 5% + sodium chloride 0.9%. 1000 milliLiter(s) IV Continuous <Continuous>  enoxaparin Injectable 40 milliGRAM(s) SubCutaneous daily  influenza   Vaccine 0.5 milliLiter(s) IntraMuscular once  morphine  - Injectable 4 milliGRAM(s) IV Push every 6 hours PRN  ondansetron Injectable 4 milliGRAM(s) IV Push every 6 hours PRN  oxycodone    5 mG/acetaminophen 325 mG 1 Tablet(s) Oral every 4 hours PRN  pantoprazole  Injectable 40 milliGRAM(s) IV Push daily  polyethylene glycol 3350 17 Gram(s) Oral two times a day PRN  senna 2 Tablet(s) Oral at bedtime      VITALS:  Vital Signs Last 24 Hrs  T(C): 36.8 (13 Mar 2021 14:25), Max: 36.8 (13 Mar 2021 04:44)  T(F): 98.2 (13 Mar 2021 14:25), Max: 98.2 (13 Mar 2021 04:44)  HR: 69 (13 Mar 2021 14:25) (58 - 69)  BP: 116/74 (13 Mar 2021 14:25) (109/63 - 122/74)  BP(mean): --  RR: 16 (13 Mar 2021 14:25) (16 - 17)  SpO2: 95% (13 Mar 2021 14:25) (95% - 97%)    EXAM:  GEN: alert, in no acute distress  CVS: rrr, normal s1/s2  RESP: clear bilaterally, no w/r/r  ABD: soft, PHILIP drain in place, surgical sites w/ dry dressing in place, normoactive bowel sounds  EXT: no LE edema  NEURO: aaox3, no focal deficits    LABS:                        11.8   8.26  )-----------( 236      ( 13 Mar 2021 08:13 )             36.0     03-13    145  |  109<H>  |  7   ----------------------------<  89  3.5   |  28  |  1.47<H>    Ca    8.4      13 Mar 2021 08:13  Phos  2.3     03-13  Mg     1.9     03-13    TPro  6.1  /  Alb  3.0<L>  /  TBili  0.2  /  DBili  x   /  AST  52<H>  /  ALT  204<H>  /  AlkPhos  156<H>  03-13      IMAGING: reviewed

## 2021-03-13 NOTE — PROGRESS NOTE ADULT - PROBLEM SELECTOR PROBLEM 1
Cholecystolithiasis
Acute pancreatitis, unspecified complication status, unspecified pancreatitis type
Transaminitis
Acute pancreatitis, unspecified complication status, unspecified pancreatitis type

## 2021-03-13 NOTE — DISCHARGE NOTE NURSING/CASE MANAGEMENT/SOCIAL WORK - PATIENT PORTAL LINK FT
You can access the FollowMyHealth Patient Portal offered by [facility] by registering at the following website: [email or web address]. By joining [facility]’s FollowMyHealth portal, you will also be able to view your health information using other applications (apps) compatible with our system.

## 2021-03-13 NOTE — PROGRESS NOTE ADULT - SUBJECTIVE AND OBJECTIVE BOX
INTERVAL HPI/OVERNIGHT EVENTS:    No acute events overnight.   Pt resting comfortably. No acute complaints.   no n/v, denies pain  darrick diet    MEDICATIONS  (STANDING):  cefoTEtan  IVPB 2 Gram(s) IV Intermittent every 12 hours  dextrose 5% + sodium chloride 0.9%. 1000 milliLiter(s) (75 mL/Hr) IV Continuous <Continuous>  enoxaparin Injectable 40 milliGRAM(s) SubCutaneous daily  influenza   Vaccine 0.5 milliLiter(s) IntraMuscular once  pantoprazole  Injectable 40 milliGRAM(s) IV Push daily  senna 2 Tablet(s) Oral at bedtime    MEDICATIONS  (PRN):  morphine  - Injectable 4 milliGRAM(s) IV Push every 6 hours PRN Severe Pain (7 - 10)  ondansetron Injectable 4 milliGRAM(s) IV Push every 6 hours PRN Nausea and/or Vomiting  oxycodone    5 mG/acetaminophen 325 mG 1 Tablet(s) Oral every 4 hours PRN Mild Pain (1 - 3)  polyethylene glycol 3350 17 Gram(s) Oral two times a day PRN Constipation      Vital Signs Last 24 Hrs  T(C): 36.8 (13 Mar 2021 04:44), Max: 36.8 (12 Mar 2021 15:12)  T(F): 98.2 (13 Mar 2021 04:44), Max: 98.3 (12 Mar 2021 15:12)  HR: 58 (13 Mar 2021 04:44) (58 - 76)  BP: 122/74 (13 Mar 2021 04:44) (106/69 - 122/74)  BP(mean): --  RR: 17 (13 Mar 2021 04:44) (16 - 18)  SpO2: 97% (13 Mar 2021 04:44) (94% - 97%)      PHYSICAL EXAM  General: Alert and oriented, not in acute distress  Resp: Breathing unlabored  Abdomen: Soft, nondistended, nontender; inc c/d/i, PHILIP brown tinged 2/2 surgicel  : No donaldson catheter, no dysuria or hematuria  Extremities: No pedal edema    I&O's Detail    12 Mar 2021 07:01  -  13 Mar 2021 07:00  --------------------------------------------------------  IN:  Total IN: 0 mL    OUT:    Bulb (mL): 65 mL  Total OUT: 65 mL    Total NET: -65 mL          LABS:                        11.8   8.26  )-----------( 236      ( 13 Mar 2021 08:13 )             36.0             03-13    145  |  109<H>  |  7   ----------------------------<  89  3.5   |  28  |  1.47<H>    Ca    8.4      13 Mar 2021 08:13  Phos  2.3     03-13  Mg     1.9     03-13    TPro  6.1  /  Alb  3.0<L>  /  TBili  0.2  /  DBili  x   /  AST  52<H>  /  ALT  204<H>  /  AlkPhos  156<H>  03-13

## 2021-03-13 NOTE — PROGRESS NOTE ADULT - PROVIDER SPECIALTY LIST ADULT
Internal Medicine
Internal Medicine
Gastroenterology
Surgery
Gastroenterology
Internal Medicine
Surgery
Internal Medicine
Internal Medicine
Gastroenterology
Internal Medicine

## 2021-03-15 PROBLEM — Z78.9 OTHER SPECIFIED HEALTH STATUS: Chronic | Status: ACTIVE | Noted: 2021-03-07

## 2021-03-15 PROBLEM — Z00.00 ENCOUNTER FOR PREVENTIVE HEALTH EXAMINATION: Status: ACTIVE | Noted: 2021-03-15

## 2021-03-16 LAB — SURGICAL PATHOLOGY STUDY: SIGNIFICANT CHANGE UP

## 2021-03-17 ENCOUNTER — APPOINTMENT (OUTPATIENT)
Dept: SURGERY | Facility: CLINIC | Age: 51
End: 2021-03-17
Payer: COMMERCIAL

## 2021-03-17 VITALS
HEART RATE: 97 BPM | BODY MASS INDEX: 25.76 KG/M2 | OXYGEN SATURATION: 97 % | HEIGHT: 62 IN | WEIGHT: 140 LBS | SYSTOLIC BLOOD PRESSURE: 111 MMHG | DIASTOLIC BLOOD PRESSURE: 74 MMHG

## 2021-03-17 DIAGNOSIS — M19.90 UNSPECIFIED OSTEOARTHRITIS, UNSPECIFIED SITE: ICD-10-CM

## 2021-03-17 DIAGNOSIS — Z80.42 FAMILY HISTORY OF MALIGNANT NEOPLASM OF PROSTATE: ICD-10-CM

## 2021-03-17 DIAGNOSIS — Z87.891 PERSONAL HISTORY OF NICOTINE DEPENDENCE: ICD-10-CM

## 2021-03-17 DIAGNOSIS — K81.0 ACUTE CHOLECYSTITIS: ICD-10-CM

## 2021-03-17 DIAGNOSIS — Z82.49 FAMILY HISTORY OF ISCHEMIC HEART DISEASE AND OTHER DISEASES OF THE CIRCULATORY SYSTEM: ICD-10-CM

## 2021-03-17 PROCEDURE — 99024 POSTOP FOLLOW-UP VISIT: CPT

## 2021-03-17 NOTE — HISTORY OF PRESENT ILLNESS
[de-identified] : СЕРГЕЙ PIZANO is a 51 year old female who presents in the office for postop and post hospitalization visit. Patient was admitted to [facility] for abdominal pain. She underwent Laparoscopic cholecystectomy with cholangiography 03/11/2021 pathology results are consistent with Chronic calculous cholecystitis. Today patient is doing well, offers no complaints. Denies any fevers, chills, nausea, vomiting, diarrhea or constipation. Patient able to tolerate regular diet with normal bowel movements. Surgical incisions are healing well. No sign of inflammation or exudate. Patient has PHILIP that draining about 32 ml daily  Patient stated that pain is improved. Patient denies any pain at present time. \par

## 2021-03-17 NOTE — ASSESSMENT
[FreeTextEntry1] : СЕРГЕЙ PIZANO is a 51 year old female who underwent a Laparoscopic cholecystectomy with cholangiography 03/11/2021\par \par Patient is doing well, with expected post-operative recovery. All surgical incisions are healing well and as expected. There is no evidence of infection or complication, and patient is progressing as expected. PHILIP drain was removed today. It was draining serosanguineous fluid   Post-operative wound care, activity, restrictions and precautions reinforced. path discussed. Patient was instructed no heavy lifting  4-6 weeks. Patient's questions and concerns addressed to patient's satisfaction. \par

## 2021-03-17 NOTE — PHYSICAL EXAM
[No Rash or Lesion] : No rash or lesion [Alert] : alert [Oriented to Person] : oriented to person [Oriented to Place] : oriented to place [Oriented to Time] : oriented to time [Calm] : calm [Tender] : was nontender [de-identified] : The patient is alert, well-groomed, well developed and cheerful.  [de-identified] : NCAT; sclerae anicteric; [de-identified] : Breath sounds equal and bilateral, no wheezing no rales or rhonchi  [de-identified] :  good S1, S2, no m/r/g bilateral  [de-identified] : Incision sites are healing well, PHILIP drain removed today  [de-identified] : WNL

## 2022-02-11 NOTE — DIETITIAN INITIAL EVALUATION ADULT. - NSPROEDAREADYLEARN_GEN_A_NUR
Received 3 disks from [facility]- put on Dr Eaton's desk for review    acuteness of illness

## 2022-05-03 NOTE — PROGRESS NOTE ADULT - PROBLEM SELECTOR PLAN 3
Oh darn! I did not have her marked as virtual. Will you tell Elma Delgado I will call her this week? I sent in a refill of her Adderall as well. IMPROVE VTE Individual Risk Assessment  RISK                                                         Points  [  ] Previous VTE                                      3  [  ] Thrombophilia                                   2  [  ] Lower limb paralysis                         2 (unable to hold up >15 seconds)    [  ] Current Cancer                                  2       (within 6 months)  [  ] Immobilization > 24 hrs                    1  [  ] ICU/CCU stay > 24 hrs                         1  [  ] Age > 60                                              1   cw lovenox for DVT ppx - likely 2/2 gallstone  - tbili 1.9 >0.8>0.8  - d.bili 0.3>0.3  -  >288  - monitor LFTs daily  - >641  - >331  - f/u MRCP, GGT and Hepatitis Panel

## 2023-02-01 NOTE — PROGRESS NOTE ADULT - SUBJECTIVE AND OBJECTIVE BOX
INTERVAL HPI/OVERNIGHT EVENTS:    Pt seen and examined at bedside. Offers no acute complaints at this time, denies abd pain, no nausea or vomiting, tolerating clear diet well. No fever, chills, SOB or CP.     Vital Signs Last 24 Hrs  T(C): 36.5 (09 Mar 2021 05:17), Max: 36.6 (08 Mar 2021 21:36)  T(F): 97.7 (09 Mar 2021 05:17), Max: 97.8 (08 Mar 2021 21:36)  HR: 64 (09 Mar 2021 05:17) (58 - 74)  BP: 100/64 (09 Mar 2021 05:17) (100/64 - 115/80)  BP(mean): --  RR: 18 (09 Mar 2021 05:17) (18 - 18)  SpO2: 97% (09 Mar 2021 05:17) (97% - 99%)  I&O's Detail    08 Mar 2021 07:01  -  09 Mar 2021 07:00  --------------------------------------------------------  IN:    Lactated Ringers: 600 mL  Total IN: 600 mL    OUT:  Total OUT: 0 mL    Total NET: 600 mL    pantoprazole  Injectable 40 milliGRAM(s) IV Push daily      Physical Exam  General: AAOx3, No acute distress  Skin: No jaundice, no icterus  Abdomen: soft, nondistended, nontender, no rebound tenderness, no guarding, no palpable masses  Extremities: non edematous, no calf pain bilaterally        Labs:                        12.8   4.71  )-----------( 235      ( 09 Mar 2021 06:49 )             40.1     03-09    143  |  106  |  11  ----------------------------<  99  3.6   |  30  |  0.70    Ca    8.7      09 Mar 2021 06:49  Phos  4.0     03-09  Mg     2.1     03-09    TPro  6.6  /  Alb  3.3<L>  /  TBili  0.5  /  DBili  x   /  AST  98<H>  /  ALT  448<H>  /  AlkPhos  271<H>  03-09    PT/INR - ( 09 Mar 2021 06:49 )   PT: 11.8 sec;   INR: 0.99 ratio         PTT - ( 09 Mar 2021 06:49 )  PTT:33.0 sec     Wheelchair/Stroller

## 2024-02-06 NOTE — PATIENT PROFILE ADULT - TRANSPORTATION
[No Acute Distress] : no acute distress [Normal Oropharynx] : normal oropharynx [Normal Appearance] : normal appearance [No Neck Mass] : no neck mass [Normal Rate/Rhythm] : normal rate/rhythm [Normal S1, S2] : normal s1, s2 [No Murmurs] : no murmurs [No Resp Distress] : no resp distress [Normal to Percussion] : normal to percussion [No Abnormalities] : no abnormalities [Benign] : benign [Normal Gait] : normal gait [No Clubbing] : no clubbing no [No Cyanosis] : no cyanosis [No Edema] : no edema [FROM] : FROM [Normal Color/ Pigmentation] : normal color/ pigmentation [No Focal Deficits] : no focal deficits [Oriented x3] : oriented x3 [Normal Affect] : normal affect [TextBox_68] : 1 plus bilat rhonchi and wheeze.

## 2024-05-21 NOTE — H&P ADULT - NSHPSOURCEINFORD_GEN_ALL_CORE
Please,    #Levothyroxine Dosing:    - Adjust levothyroxine tablets to 100 mcg by mouth daily at 6:00 AM  - Take it every day, on an empty stomach, 30 minutes before eating  - Avoid taking it with iron, calcium, other medications (blocks absorption), wait at least 4 hrs after taking levothyroxine to take these medications  - If you miss a pill, you can take 2 the next day and return to schedule from next day     Repeat blood work in 6 weeks and in 3 months time.  Depending on your blood work we will adjust therapy.    Follow-up in 3 months time.    Thank you for your visit today.    If you have any questions or concerns please feel free to reach out of the office.    Patient

## 2024-12-19 NOTE — PATIENT PROFILE ADULT - NSTRANSFERBELONGINGSRESP_GEN_A_NUR
Call returned to patient. Day 35 and no period today, she is starting provera. Update sent to pavel.  
Has not started period so needing to start Provera  
Spoke with patient and informed her of possible remapping of FET cycle pending withdrawal bleed post provera. Per patient she normally gets a period about 2 days into taking provera. She was instructed to call back if no period 7 days post last provera tablet. She verbalized understanding of the above.   
no